# Patient Record
(demographics unavailable — no encounter records)

---

## 2024-12-18 NOTE — REVIEW OF SYSTEMS
[Negative] : Heme/Lymph [FreeTextEntry5] : hypertension, hyperlipidemia, mitral valve repair [FreeTextEntry8] : BPH [de-identified] : wound left lateral lower leg

## 2024-12-18 NOTE — HISTORY OF PRESENT ILLNESS
[FreeTextEntry1] : 12/18/24  CHECO SUMMERS is being seen for a initial assessment visit. Patient presents with left lower leg wound, trauma wound from a car door. The wound occurred 5-6 weeks ago. There was a scab, and patient removed the scab recently and went to dermatologist for wound care. The dermatologist biopsied the wound and referred to the [facility]. Biopsy was negative for malignancy. Patient accompanied by family member.

## 2024-12-18 NOTE — VITALS
[Pain related to present condition?] : The patient's  pain is related to present condition. [Sharp] : sharp [Occasional] : occasional [] : Yes [de-identified] : 10/10 [FreeTextEntry3] : Left lower leg [FreeTextEntry1] : dangling legs [FreeTextEntry2] : elevation [FreeTextEntry4] : dangle legs [FreeTextEntry5] : Medication reconciliation, initial visit

## 2024-12-18 NOTE — ASSESSMENT
[Verbal] : Verbal [Demo] : Demo [Patient] : Patient [Significant other] : Significant other [Good - alert, interested, motivated] : Good - alert, interested, motivated [Verbalizes knowledge/Understanding] : Verbalizes knowledge/understanding [Dressing changes] : dressing changes [Foot Care] : foot care [Skin Care] : skin care [Signs and symptoms of infection] : sign and symptoms of infection [Venous Disease] : venous disease [Nutrition] : nutrition [Arterial Disease] : arterial disease [How and When to Call] : how and when to call [Labs and Tests] : labs and tests [Pain Management] : pain management [Patient responsibility to plan of care] : patient responsibility to plan of care [] : Yes [Stable] : stable [Home] : Home [Ambulatory] : Ambulatory [Not Applicable - Long Term Care/Home Health Agency] : Long Term Care/Home Health Agency: Not Applicable [FreeTextEntry2] : Infection prevention Wound care (dressing changes) Maintain optimal skin integrity to high pressure areas Nutrition and wound healing Elevation and low sodium compliance. Offloading the stress on skin structures and decreasing potential pathologic biomechanical influences. Venous stasis skin changes of lower legs, hemosiderin staining Vascular studies Autolytic debridement [FreeTextEntry3] : Initial Visit [FreeTextEntry4] : F/U 2 weeks Referred to podiatry clinic for nail/foot care. Start use of medihoney, pharmacy verified. Patient will be able to perform dressing changes at home. Supply order requested, small amount of supplies provided to prevent delay in care. Vascular consult submitted, vascular studies submitted for authorization.

## 2024-12-18 NOTE — PLAN
[FreeTextEntry1] : laceration left lateral lower leg Vivian Hatch,DD, QOD venous and arterial vascular studies Vascular consult return 2 weeks 30 minutes spent in review,evaluation and treatment planning

## 2024-12-18 NOTE — PHYSICAL EXAM
[4 x 4] : 4 x 4  [0] : left 0 [Ankle Swelling (On Exam)] : present [Ankle Swelling On The Right] : mild [Ankle Swelling On The Left] : of the left ankle [Alert] : alert [Oriented to Person] : oriented to person [Oriented to Place] : oriented to place [Oriented to Time] : oriented to time [Anxious] : anxious [de-identified] : WD WN 73 Y/O white male in NAD [de-identified] : wound left lateral lower leg. No SOI some slough noted [FreeTextEntry1] : lateral lower leg [FreeTextEntry2] : 2.0 [FreeTextEntry3] : 1.2 [FreeTextEntry4] : 0.2 [de-identified] : serosanguinous drainage noted [de-identified] : Medihoney,  Calcium Alginate [de-identified] :  Mechanically cleansed with sterile gauze and normal saline 0.9% Paper tape [de-identified] :  Dorsalis Pedis: +1 Right/Left Posterior Tibialis: +1 Right/Left Doppler pulses: positive (Right DP/PT monophasic, Left DP monophasic, Left PT biphasic) Extremity color: normal Extremity temperature: Warm Capillary refill: < 3 sec Thickened yellow toenails +3 edema of lower legs [TWNoteComboBox1] : Left [TWNoteComboBox4] : Moderate [TWNoteComboBox5] : No [TWNoteComboBox6] : Traumatic [de-identified] : No [de-identified] : Normal [de-identified] : None [de-identified] : None [de-identified] : <20% [de-identified] : Yes [de-identified] : 3x Weekly [de-identified] : Primary Dressing

## 2024-12-30 NOTE — REVIEW OF SYSTEMS
[Hand Pain] : hand pain [Rash] : no rash [Depression] : no depression [Anxiety] : no anxiety [Easy Bleeding] : no tendency for easy bleeding [Easy Bruising] : no tendency for easy bruising [Negative] : Heme/Lymph [FreeTextEntry5] : See HPI  [de-identified] : Vertigo

## 2024-12-30 NOTE — HISTORY OF PRESENT ILLNESS
[FreeTextEntry1] : I saw Cirilo Swann in the office today for cardiac follow up.      He is a 72-year-old white male who is status post mitral valve repair 2002. At that time he had normal coronary arteries. He is also being treated for hypertension. He denies chest pain, SOB, MAIER.  He has intermittent ankle swelling.   He is physically active and has no chest pain. Occasionally he has mild shortness of breath. He does not do exercise but keep himself physically active.  The patient had a carotid Doppler performed yearly since valve repair which demonstrates mild plaque. He continues to take his ASA 3 x a week despite education on importance of taking daily.  He is also not taking his Crestor daily as he does not like how he feels.    Most recent echocardiography done in the hospital showed normal LV function with no significant valvular disease.  I increased nebivolol to 10 QD.   His BP is better controlled.  He at times gets spikes in his BP, mostly related to timing of his medications.  His BP is controlled in the office today.    He is averse to adding more medications to his regimen. He is dealing with a RLE wound that he got from leg trauma when getting out of his car.

## 2024-12-30 NOTE — REASON FOR VISIT
[Follow-Up - Clinic] : a clinic follow-up of [Hypertension] : hypertension [FreeTextEntry1] : Mitral valve repair, VPCs

## 2024-12-30 NOTE — DISCUSSION/SUMMARY
[FreeTextEntry1] : 73 y/o male with PMhx as noted who presents to the office for a follow up evaluation.   His EKG unchanged. He should continue a daily baby aspirin.  He should be better about taking it daily.  He does not want to take Crestor.   His BP is mostly controlled, but he does have spikes related to when he takes his medications.   He will continue nebivolol 10 QD and nifedipine 60 QD.  He is taking low dose Lasix, to be taken as needed for mild ankle edema.  We discussed importance of healthy eating and daily exercise to reduce the risk of future cardiac events.    He will follow in three months.  He knows to call the office with any issues.  I am repeating a full set of blood work.  [EKG obtained to assist in diagnosis and management of assessed problem(s)] : EKG obtained to assist in diagnosis and management of assessed problem(s)

## 2024-12-30 NOTE — PHYSICAL EXAM
[General Appearance - Well Developed] : well developed [Normal Appearance] : normal appearance [Well Groomed] : well groomed [General Appearance - Well Nourished] : well nourished [No Deformities] : no deformities [General Appearance - In No Acute Distress] : no acute distress [Normal Conjunctiva] : the conjunctiva exhibited no abnormalities [Normal Oral Mucosa] : normal oral mucosa [Normal Jugular Venous A Waves Present] : normal jugular venous A waves present [Normal Jugular Venous V Waves Present] : normal jugular venous V waves present [No Jugular Venous Ortiz A Waves] : no jugular venous ortiz A waves [Respiration, Rhythm And Depth] : normal respiratory rhythm and effort [Exaggerated Use Of Accessory Muscles For Inspiration] : no accessory muscle use [Auscultation Breath Sounds / Voice Sounds] : lungs were clear to auscultation bilaterally [Bowel Sounds] : normal bowel sounds [Abdomen Soft] : soft [Abdomen Tenderness] : non-tender [Abnormal Walk] : normal gait [Gait - Sufficient For Exercise Testing] : the gait was sufficient for exercise testing [Nail Clubbing] : no clubbing of the fingernails [Cyanosis, Localized] : no localized cyanosis [Skin Color & Pigmentation] : normal skin color and pigmentation [Skin Turgor] : normal skin turgor [] : no rash [Oriented To Time, Place, And Person] : oriented to person, place, and time [Impaired Insight] : insight and judgment were intact [No Anxiety] : not feeling anxious [Normal Rate] : normal [Normal S1] : normal S1 [Normal S2] : normal S2 [S3] : no S3 [S4] : no S4 [No Murmur] : no murmurs heard [Right Carotid Bruit] : no bruit heard over the right carotid [Left Carotid Bruit] : no bruit heard over the left carotid [Right Femoral Bruit] : no bruit heard over the right femoral artery [Left Femoral Bruit] : no bruit heard over the left femoral artery [2+] : left 2+ [No Abnormalities] : the abdominal aorta was not enlarged and no bruit was heard [___ +] : bilateral [unfilled]U+ pitting edema to the ankles

## 2024-12-30 NOTE — CARDIOLOGY SUMMARY
[de-identified] : Sinus bradycardia.  [de-identified] : 5/2022 - Mitral Valve annuplasty ring seen. minimal mitral regurgitation. EF 55 - 60% [de-identified] : 8/2019 Carotid Doppler - mild plaque\par  5/2022 - Carotid Doppler - mild plaque to Left and right carotid [Normal] : normal [___] : [unfilled] [LVEF ___%] : LVEF [unfilled]%

## 2025-01-03 NOTE — PHYSICAL EXAM
[4 x 4] : 4 x 4  [0] : left 0 [Ankle Swelling (On Exam)] : present [Ankle Swelling On The Left] : of the left ankle [Ankle Swelling On The Right] : mild [Alert] : alert [Oriented to Person] : oriented to person [Oriented to Place] : oriented to place [Oriented to Time] : oriented to time [Anxious] : anxious [de-identified] : <20% [de-identified] : WD WN 73 Y/O white male in NAD [de-identified] : wound left lateral lower leg. No SOI some slough noted [FreeTextEntry1] : lateral lower leg [FreeTextEntry2] : 2.9 [FreeTextEntry3] : 1.3 [FreeTextEntry4] : 0.2-0.3 [de-identified] : serosanguinous drainage noted [de-identified] : hyperpigmentation/crusting [de-identified] : 10% black dry necrotic  [de-identified] : 70%moist red granulation [de-identified] : 20% firmly adherent slough [de-identified] : Medihoney,  Calcium Alginate [de-identified] :  Mechanically cleansed with sterile gauze and normal saline 0.9% Paper tape [de-identified] :  Dorsalis Pedis: +1 Right/Left Posterior Tibialis: +1 Right/Left Doppler pulses: positive (Right DP/PT monophasic, Left DP monophasic, Left PT biphasic) Extremity color: normal Extremity temperature: Warm Capillary refill: < 3 sec Thickened yellow toenails +3 edema of lower legs [TWNoteComboBox1] : Left [TWNoteComboBox5] : No [TWNoteComboBox4] : Small [TWNoteComboBox6] : Traumatic [de-identified] : No [de-identified] : other [de-identified] : None [de-identified] : <20% [de-identified] : Yes [de-identified] : 3x Weekly [de-identified] : Primary Dressing

## 2025-01-03 NOTE — PLAN
[FreeTextEntry1] : laceration left lateral lower leg Holden,,DD, QOD venous and arterial vascular studies pending Vascular consult return 2 weeks 20 minutes spent in review,evaluation and treatment planning

## 2025-01-03 NOTE — REVIEW OF SYSTEMS
[Negative] : Heme/Lymph [FreeTextEntry5] : hypertension, hyperlipidemia, mitral valve repair [FreeTextEntry8] : BPH [de-identified] : wound left lateral lower leg

## 2025-01-03 NOTE — PHYSICAL EXAM
[4 x 4] : 4 x 4  [0] : left 0 [Ankle Swelling (On Exam)] : present [Ankle Swelling On The Left] : of the left ankle [Ankle Swelling On The Right] : mild [Alert] : alert [Oriented to Person] : oriented to person [Oriented to Place] : oriented to place [Oriented to Time] : oriented to time [Anxious] : anxious [de-identified] : <20% [de-identified] : WD WN 73 Y/O white male in NAD [de-identified] : wound left lateral lower leg. No SOI some slough noted [FreeTextEntry1] : lateral lower leg [FreeTextEntry2] : 2.9 [FreeTextEntry3] : 1.3 [FreeTextEntry4] : 0.2-0.3 [de-identified] : serosanguinous drainage noted [de-identified] : hyperpigmentation/crusting [de-identified] : 10% black dry necrotic  [de-identified] : 70%moist red granulation [de-identified] : 20% firmly adherent slough [de-identified] : Medihoney,  Calcium Alginate [de-identified] :  Mechanically cleansed with sterile gauze and normal saline 0.9% Paper tape [de-identified] :  Dorsalis Pedis: +1 Right/Left Posterior Tibialis: +1 Right/Left Doppler pulses: positive (Right DP/PT monophasic, Left DP monophasic, Left PT biphasic) Extremity color: normal Extremity temperature: Warm Capillary refill: < 3 sec Thickened yellow toenails +3 edema of lower legs [TWNoteComboBox1] : Left [TWNoteComboBox4] : Small [TWNoteComboBox5] : No [TWNoteComboBox6] : Traumatic [de-identified] : No [de-identified] : other [de-identified] : None [de-identified] : <20% [de-identified] : Yes [de-identified] : 3x Weekly [de-identified] : Primary Dressing

## 2025-01-03 NOTE — ASSESSMENT
[Verbal] : Verbal [Demo] : Demo [Patient] : Patient [Significant other] : Significant other [Good - alert, interested, motivated] : Good - alert, interested, motivated [Verbalizes knowledge/Understanding] : Verbalizes knowledge/understanding [Dressing changes] : dressing changes [Foot Care] : foot care [Skin Care] : skin care [Signs and symptoms of infection] : sign and symptoms of infection [Venous Disease] : venous disease [Nutrition] : nutrition [Arterial Disease] : arterial disease [How and When to Call] : how and when to call [Labs and Tests] : labs and tests [Pain Management] : pain management [Patient responsibility to plan of care] : patient responsibility to plan of care [Stable] : stable [Home] : Home [Ambulatory] : Ambulatory [Not Applicable - Long Term Care/Home Health Agency] : Long Term Care/Home Health Agency: Not Applicable [] : No [FreeTextEntry2] : Infection prevention Wound care (dressing changes) Maintain optimal skin integrity to high pressure areas Nutrition and wound healing Elevation and low sodium compliance. Offloading the stress on skin structures and decreasing potential pathologic biomechanical influences. Venous stasis skin changes of lower legs, hemosiderin staining Vascular studies Autolytic debridement [FreeTextEntry4] : F/U 2 weeks pt is independent with dressing changes, pt has supplies  vascular studies approved, pt provided with vascular lab contact number

## 2025-01-03 NOTE — REVIEW OF SYSTEMS
[Negative] : Heme/Lymph [FreeTextEntry5] : hypertension, hyperlipidemia, mitral valve repair [FreeTextEntry8] : BPH [de-identified] : wound left lateral lower leg

## 2025-01-03 NOTE — HISTORY OF PRESENT ILLNESS
[FreeTextEntry1] : 12/18/24  CHECO SUMMERS is being seen for a initial assessment visit. Patient presents with left lower leg wound, trauma wound from a car door. The wound occurred 5-6 weeks ago. There was a scab, and patient removed the scab recently and went to dermatologist for wound care. The dermatologist biopsied the wound and referred to the [facility]. Biopsy was negative for malignancy. Patient accompanied by family member. 1/3/25 left shin wound smaller and . No SOI

## 2025-01-03 NOTE — VITALS
[Pain related to present condition?] : The patient's  pain is related to present condition. [Burning] : burning [] : No [de-identified] : 8/10 [FreeTextEntry3] : DEMOND  [FreeTextEntry1] : LE dependent  [FreeTextEntry2] : elevation/worse at night  [FreeTextEntry4] : legs Dependent during visit

## 2025-01-03 NOTE — VITALS
[Pain related to present condition?] : The patient's  pain is related to present condition. [Burning] : burning [] : No [de-identified] : 8/10 [FreeTextEntry3] : DEMOND  [FreeTextEntry1] : LE dependent  [FreeTextEntry2] : elevation/worse at night  [FreeTextEntry4] : legs Dependent during visit

## 2025-01-03 NOTE — HISTORY OF PRESENT ILLNESS
[FreeTextEntry1] : 12/18/24  CHECO SUMMERS is being seen for a initial assessment visit. Patient presents with left lower leg wound, trauma wound from a car door. The wound occurred 5-6 weeks ago. There was a scab, and patient removed the scab recently and went to dermatologist for wound care. The dermatologist biopsied the wound and referred to the [facility]. Biopsy was negative for malignancy. Patient accompanied by family member. 1/3/25 left shin wound smaller and . No SOI

## 2025-01-16 NOTE — HISTORY OF PRESENT ILLNESS
[FreeTextEntry1] : 12/18/24  CHECO SUMMERS is being seen for a initial assessment visit. Patient presents with left lower leg wound, trauma wound from a car door. The wound occurred 5-6 weeks ago. There was a scab, and patient removed the scab recently and went to dermatologist for wound care. The dermatologist biopsied the wound and referred to the [facility]. Biopsy was negative for malignancy. Patient accompanied by family member. 1/3/25 left shin wound smaller and . No SOI 1/16/25 left lower leg wound laterally  and slightly longer. Patient admits to working recently but has now stopped. New small scabbed wound anteriorly from tape. No SOI Patient relates that he has left lower leg wound pain when elevated with improvement when dependent

## 2025-01-16 NOTE — REVIEW OF SYSTEMS
[Negative] : Heme/Lymph [FreeTextEntry5] : hypertension, hyperlipidemia, mitral valve repair [FreeTextEntry8] : BPH [de-identified] : wound left lateral lower leg

## 2025-01-16 NOTE — ASSESSMENT
[Verbal] : Verbal [Demo] : Demo [Patient] : Patient [Significant other] : Significant other [Good - alert, interested, motivated] : Good - alert, interested, motivated [Verbalizes knowledge/Understanding] : Verbalizes knowledge/understanding [Dressing changes] : dressing changes [Foot Care] : foot care [Skin Care] : skin care [Signs and symptoms of infection] : sign and symptoms of infection [Venous Disease] : venous disease [Nutrition] : nutrition [Arterial Disease] : arterial disease [How and When to Call] : how and when to call [Labs and Tests] : labs and tests [Pain Management] : pain management [Patient responsibility to plan of care] : patient responsibility to plan of care [Stable] : stable [Home] : Home [Ambulatory] : Ambulatory [Not Applicable - Long Term Care/Home Health Agency] : Long Term Care/Home Health Agency: Not Applicable [] : Yes [FreeTextEntry2] : Infection prevention Wound care (dressing changes) Maintain optimal skin integrity to high pressure areas Nutrition and wound healing Elevation and low sodium compliance. Offloading the stress on skin structures and decreasing potential pathologic biomechanical influences. Venous stasis skin changes of lower legs, hemosiderin staining Vascular studies Autolytic debridement [FreeTextEntry4] : F/U 1 week Vascular appointment on 1/17/25. consult date pending.  pt is independent with dressing changes, pt has supplies

## 2025-01-16 NOTE — PHYSICAL EXAM
[0] : left 0 [Ankle Swelling (On Exam)] : present [Ankle Swelling On The Left] : of the left ankle [Ankle Swelling On The Right] : mild [Alert] : alert [Oriented to Person] : oriented to person [Oriented to Place] : oriented to place [Oriented to Time] : oriented to time [Anxious] : anxious [de-identified] : WD WN 73 Y/O white male in NAD [de-identified] : wound left lateral lower leg. No SOI some slough noted [2 x 2] : 2 x 2  [FreeTextEntry1] : lateral lower leg [FreeTextEntry2] : 3.5 [FreeTextEntry3] : 1.4 [FreeTextEntry4] : 0.2 [de-identified] : serosanguinous drainage noted [de-identified] : hyperpigmentation/crusting [de-identified] : 5% black dry necrotic  [de-identified] : 80%moist red granulation [de-identified] : 15% firmly adherent slough [de-identified] : Santyl  [de-identified] :  Mechanically cleansed with sterile gauze and normal saline 0.9% Cloth tape [FreeTextEntry7] : Anterior lower leg [FreeTextEntry8] : 0.4 [FreeTextEntry9] : 1.2 [de-identified] : 0.1 [de-identified] : Due to harsh tape removal  [de-identified] : DSNARESH [de-identified] :  Mechanically cleansed with sterile gauze and normal saline 0.9% Cloth tape [de-identified] :  Dorsalis Pedis: +1 Right/Left Posterior Tibialis: +1 Right/Left Doppler pulses: positive (Right DP/PT monophasic, Left DP monophasic, Left PT biphasic) Extremity color: normal Extremity temperature: Warm Capillary refill: < 3 sec Thickened yellow toenails +3 edema of lower legs [TWNoteComboBox1] : Left [TWNoteComboBox4] : Small [TWNoteComboBox5] : No [TWNoteComboBox6] : Traumatic [de-identified] : No [de-identified] : other [de-identified] : None [de-identified] : False [de-identified] : Yes [de-identified] : 3x Weekly [de-identified] : Primary Dressing [TWNoteComboBox9] : Left [de-identified] : None [de-identified] : No [de-identified] : Traumatic [de-identified] : No [de-identified] : Normal [de-identified] : None [de-identified] : None [de-identified] : 100% [de-identified] : No [de-identified] : 3x Weekly [de-identified] : Primary Dressing

## 2025-01-16 NOTE — PLAN
[FreeTextEntry1] : laceration left lateral lower leg collagenase,DD, QD venous and arterial vascular studies pending Vascular consult return 1 week 20 minutes spent in review,evaluation and treatment planning

## 2025-01-16 NOTE — VITALS
[Pain related to present condition?] : The patient's  pain is related to present condition. [Burning] : burning [] : No [de-identified] : 4/10 [FreeTextEntry3] : DEMOND  [FreeTextEntry1] : LE dependent  [FreeTextEntry2] : elevation/worse at night  [FreeTextEntry4] : legs Dependent during visit

## 2025-01-22 NOTE — PHYSICAL EXAM
[Normal Breath Sounds] : Normal breath sounds [2+] : left 2+ [Ankle Swelling (On Exam)] : present [Ankle Swelling Bilaterally] : bilaterally  [Ankle Swelling On The Left] : moderate [Varicose Veins Of Lower Extremities] : present [Varicose Veins Of The Right Leg] : of the right leg [] : of the left leg [Ankle Swelling On The Right] : mild [Skin Ulcer] : ulcer [Alert] : alert [Oriented to Person] : oriented to person [Oriented to Place] : oriented to place [Oriented to Time] : oriented to time [Calm] : calm [de-identified] : Appears well [de-identified] : +BLLE swelling, L >R with evidence of pitting edema.  +Lipodermatosclerosis bilateral calves.  LLE lateral wound clean, superficial, healthy pink granulation.

## 2025-01-22 NOTE — HISTORY OF PRESENT ILLNESS
[FreeTextEntry1] : 71 yo male accompanied by his wife presents as a new patient for a LLE lateral calf wound assessment.  Pt sustained traumatic wound in Dec 2024.  Pt is currently taking a diuretic prescribed by his cardiologist for BLLE chronic swelling.  Pt's swelling on his LLE has been worse since he sustained the injury and wound in Dec.  He is currently being treated at the [facility].  Pmx HLD, chronic lower extremity swelling, s/p MVR, BPH.

## 2025-01-22 NOTE — ASSESSMENT
[FreeTextEntry1] : 73yo male with evidence of chronic swelling and venous skin changes presents with an open clean left lateral wound. Bilateral lower extremities edema, worse on the Left from dorsum of the foot ankle and calf. Positive chronic skin changes/lipodermatosclerosis. Pt has elements of Lymphedema secondary to years of chronic swelling and likely exasperated recently by his traumatic injury.    Recent vascular testing shows he has adequate arterial perfusion and no evidence of any significant venous reflux. At this time, no further vascular intervention indicated.  Pt explained to importance of controlling swelling and being compliant with his diuretic medication to help in wound healing.    Recommend compression stockings 20-30 mmHg daily from awakening until bedtime, leg elevation above the level of the heart at rest, frequent ambulation and walk daily for exercise. Advised patient to follow up if he develops worsening symptoms including LE pain, swelling or worsening varicosities.    Prior to appointment and during encounter with patient extensive medical records were reviewed including but not limited to, Hospital records, out patient records, laboratory data and microbiology data In addition extensive time was also spent in reviewing diagnostic studies.  Total encounter total time 45 mins >50% of time spent counseling/coordinating care

## 2025-01-23 NOTE — PHYSICAL EXAM
[4 x 4] : 4 x 4  [JVD] : no jugular venous distention  [Normal Thyroid] : the thyroid was normal [Normal Breath Sounds] : Normal breath sounds [Normal Heart Sounds] : normal heart sounds [Normal Rate and Rhythm] : normal rate and rhythm [Ankle Swelling (On Exam)] : present [Ankle Swelling On The Left] : moderate [Abdomen Masses] : No abdominal massess [Abdomen Tenderness] : ~T ~M No abdominal tenderness [Tender] : nontender [Enlarged] : not enlarged [Alert] : alert [Oriented to Person] : oriented to person [Oriented to Place] : oriented to place [Oriented to Time] : oriented to time [Calm] : calm [de-identified] : elderly WM, NAD, alert, Ox3. [FreeTextEntry1] : Left lateral lower leg  [FreeTextEntry2] : 4.4 [FreeTextEntry3] : 1.6 [FreeTextEntry4] : 0.2 [de-identified] : Serous/sanguinous [de-identified] : 15% [de-identified] : Cynthia  [de-identified] : Mechanically cleansed with sterile gauze and normal saline. Kerlix  [FreeTextEntry7] : Left anterior lower leg - Dry scab - No oepn wounds  [de-identified] : Dry dressing  [TWNoteComboBox4] : Small [de-identified] : Mechanically cleansed with sterile gauze and normal saline. Kerlix  [TWNoteComboBox6] : Traumatic [de-identified] : Normal [de-identified] : None [de-identified] : None [de-identified] : >75% [de-identified] : Yes [de-identified] : Every other day [de-identified] : Primary Dressing [de-identified] : None [de-identified] : Traumatic [de-identified] : Normal [de-identified] : None [de-identified] : None [de-identified] : No [de-identified] : Every other day [de-identified] : Secondary Dressing

## 2025-01-23 NOTE — VITALS
[Pain related to present condition?] : The patient's  pain is related to present condition. [Tender] : tender [Occasional] : occasional [] : No [de-identified] : 4/10  [FreeTextEntry1] : Protective dressing.  [FreeTextEntry3] : Left lateral leg  [FreeTextEntry2] : Direct contact  [FreeTextEntry4] : Protective dressing

## 2025-01-23 NOTE — HISTORY OF PRESENT ILLNESS
[FreeTextEntry1] : 71 yo WM, here for f/u of chronic LLE traumatic wound. Has been using collagenase and granulation tissue now seen. No SOI. Recent bx was neg by dermatologist as per pt. Pt met with vasccular surg. Dr. Kelley who recommended leg elevation/compression.

## 2025-01-23 NOTE — REVIEW OF SYSTEMS
[Negative] : Heme/Lymph [FreeTextEntry5] : hypertension, hyperlipidemia, mitral valve repair [FreeTextEntry8] : BPH [de-identified] : wound left lateral lower leg

## 2025-01-23 NOTE — ASSESSMENT
[Verbal] : Verbal [Written] : Written [Demo] : Demo [Patient] : Patient [Good - alert, interested, motivated] : Good - alert, interested, motivated [Verbalizes knowledge/Understanding] : Verbalizes knowledge/understanding [Dressing changes] : dressing changes [Skin Care] : skin care [Signs and symptoms of infection] : sign and symptoms of infection [Venous Disease] : venous disease [Nutrition] : nutrition [How and When to Call] : how and when to call [Pain Management] : pain management [Patient responsibility to plan of care] : patient responsibility to plan of care [] : Yes [Stable] : stable [Home] : Home [Ambulatory] : Ambulatory [Not Applicable - Long Term Care/Home Health Agency] : Long Term Care/Home Health Agency: Not Applicable [FreeTextEntry2] : Infection prevention Localized wound care  Goal remaining pain free regarding wounds collagen matrix therapy  [FreeTextEntry4] : Patient has supplies at home and preforms his own dressing changes. Follow up in 1 week

## 2025-01-30 NOTE — HISTORY OF PRESENT ILLNESS
[FreeTextEntry1] : 73 yo WM, here for f/u of chronic LLE traumatic wound. Has been using collagenase and granulation tissue now seen. No SOI. Recent bx was neg by dermatologist as per pt. Pt met with vasccular surg. Dr. Kelley who recommended leg elevation/compression. 1/30/25 left lateral lower leg wound larger with slough. No SOI

## 2025-01-30 NOTE — REVIEW OF SYSTEMS
[Negative] : Heme/Lymph [FreeTextEntry5] : hypertension, hyperlipidemia, mitral valve repair [FreeTextEntry8] : BPH [de-identified] : wound left lateral lower leg

## 2025-01-30 NOTE — PHYSICAL EXAM
[4 x 4] : 4 x 4  [JVD] : no jugular venous distention  [Normal Thyroid] : the thyroid was normal [Normal Breath Sounds] : Normal breath sounds [Normal Heart Sounds] : normal heart sounds [Normal Rate and Rhythm] : normal rate and rhythm [Ankle Swelling (On Exam)] : present [Ankle Swelling On The Left] : moderate [Abdomen Masses] : No abdominal massess [Abdomen Tenderness] : ~T ~M No abdominal tenderness [Tender] : nontender [Enlarged] : not enlarged [Alert] : alert [Oriented to Person] : oriented to person [Oriented to Place] : oriented to place [Oriented to Time] : oriented to time [Calm] : calm [de-identified] : elderly WM, NAD, alert, Ox3. [FreeTextEntry1] : Left lateral lower leg  [FreeTextEntry2] : 6.0 [FreeTextEntry3] : 2.0 [FreeTextEntry4] : 0.2 [de-identified] : Serosanguinous [de-identified] : 40% [de-identified] : 40% [de-identified] : Personal Compression Stockings  - Circulation and Neuromuscular assessment done post application. [de-identified] : Collagenase  [de-identified] : Mechanically cleansed with 0.9% Normal Saline. Kerlix  [FreeTextEntry7] : Left anterior lower leg - Dry scab - No oepn wounds  [de-identified] : Dry dressing  [de-identified] : Mechanically cleansed with 0.9% Normal Saline. Kerlix  [TWNoteComboBox4] : Moderate [TWNoteComboBox5] : No [TWNoteComboBox6] : Traumatic [de-identified] : No [de-identified] : Normal [de-identified] : None [de-identified] : <20% [de-identified] : False [de-identified] : Yes [de-identified] : Daily [de-identified] : Primary Dressing [de-identified] : None [de-identified] : Traumatic [de-identified] : Normal [de-identified] : None [de-identified] : None [de-identified] : No [de-identified] : Every other day [de-identified] : Secondary Dressing

## 2025-01-30 NOTE — PLAN
[FreeTextEntry1] : leg elevation stressed collagenaseDD QD, own compression f/u 1 wk  time spent 25 mins.

## 2025-01-30 NOTE — ASSESSMENT
[Verbal] : Verbal [Written] : Written [Demo] : Demo [Patient] : Patient [Good - alert, interested, motivated] : Good - alert, interested, motivated [Verbalizes knowledge/Understanding] : Verbalizes knowledge/understanding [Dressing changes] : dressing changes [Skin Care] : skin care [Signs and symptoms of infection] : sign and symptoms of infection [Venous Disease] : venous disease [Nutrition] : nutrition [How and When to Call] : how and when to call [Pain Management] : pain management [Patient responsibility to plan of care] : patient responsibility to plan of care [Stable] : stable [Home] : Home [Ambulatory] : Ambulatory [Not Applicable - Long Term Care/Home Health Agency] : Long Term Care/Home Health Agency: Not Applicable [Compression Therapy] : compression therapy [] : No [FreeTextEntry2] : Infection Prevention Wound care and Dressing changes Promote and Restore optimal skin integrity Nutrition and Wound Healing  Offloading and Pressure relief Protect and Guard wound site  Maintain acceptable levels of Pain Compliance R/T Compression therapy Compliance R/T Elevation of Lower Extremities [FreeTextEntry3] : Wound Larger, Increase in Fibrinous tissue.  [FreeTextEntry4] : MD assessed wound site Changed tx to Collagenase QD Advised pt. wear Personal compression stockings daily, not just "occassionally" -  Offered ACE Wraps this visit - Pt. declined.  Patient verbalized understanding of all discussed. Patient to return to [facility] in One week Pt. has gauze, personal compression stockings.  Given small amount of Collagenase for continuity of care.

## 2025-02-06 NOTE — PLAN
[FreeTextEntry1] : leg elevation stressed collagenaseDD QD, own compression doxycycline 100mg BID x 1 week f/u 1 wk  time spent 25 mins.

## 2025-02-06 NOTE — HISTORY OF PRESENT ILLNESS
[FreeTextEntry1] : 71 yo WM, here for f/u of chronic LLE traumatic wound. Has been using collagenase and granulation tissue now seen. No SOI. Recent bx was neg by dermatologist as per pt. Pt met with vasccular surg. Dr. Kelley who recommended leg elevation/compression. 1/30/25 left lateral lower leg wound larger with slough. No SOI 2/6/25 mild periwound erythema and increased discomfort. Slight increased warmth. Wound slightly larger. Some serous drainage

## 2025-02-06 NOTE — PHYSICAL EXAM
[4 x 4] : 4 x 4  [Normal Thyroid] : the thyroid was normal [Normal Breath Sounds] : Normal breath sounds [Normal Heart Sounds] : normal heart sounds [Normal Rate and Rhythm] : normal rate and rhythm [Ankle Swelling (On Exam)] : present [Ankle Swelling On The Left] : moderate [Alert] : alert [Oriented to Person] : oriented to person [Oriented to Place] : oriented to place [Oriented to Time] : oriented to time [Calm] : calm [JVD] : no jugular venous distention  [Abdomen Masses] : No abdominal massess [Abdomen Tenderness] : ~T ~M No abdominal tenderness [Tender] : nontender [Enlarged] : not enlarged [de-identified] : elderly WM, NAD, alert, Ox3. [FreeTextEntry1] : Left lateral lower leg  [FreeTextEntry2] : 6.0 [FreeTextEntry3] : 3.0 [FreeTextEntry4] : 0.2 [de-identified] : Serosanguinous [de-identified] : 40% [de-identified] : 40% [de-identified] : @ 10:33 [de-identified] : Personal Compression Stockings  - Circulation and Neuromuscular assessment done post application. [de-identified] : Collagenase,  Calcium Alginate  [de-identified] : Mechanically cleansed with 0.9% Normal Saline. Kerlix  [FreeTextEntry7] : Left anterior lower leg - healed [TWNoteComboBox4] : Moderate [TWNoteComboBox5] : No [TWNoteComboBox6] : Traumatic [de-identified] : No [de-identified] : Normal [de-identified] : None [de-identified] : <20% [de-identified] : Yes [de-identified] : Cultures obtained [de-identified] : Daily [de-identified] : Primary Dressing [de-identified] : None [de-identified] : Traumatic [de-identified] : Normal [de-identified] : None [de-identified] : None [de-identified] : No

## 2025-02-06 NOTE — REVIEW OF SYSTEMS
[Negative] : Heme/Lymph [FreeTextEntry5] : hypertension, hyperlipidemia, mitral valve repair [FreeTextEntry8] : BPH [de-identified] : wound left lateral lower leg

## 2025-02-06 NOTE — ASSESSMENT
[Verbal] : Verbal [Written] : Written [Demo] : Demo [Patient] : Patient [Good - alert, interested, motivated] : Good - alert, interested, motivated [Verbalizes knowledge/Understanding] : Verbalizes knowledge/understanding [Dressing changes] : dressing changes [Skin Care] : skin care [Signs and symptoms of infection] : sign and symptoms of infection [Venous Disease] : venous disease [Nutrition] : nutrition [How and When to Call] : how and when to call [Pain Management] : pain management [Compression Therapy] : compression therapy [Patient responsibility to plan of care] : patient responsibility to plan of care [Stable] : stable [Home] : Home [Ambulatory] : Ambulatory [Not Applicable - Long Term Care/Home Health Agency] : Long Term Care/Home Health Agency: Not Applicable [Labs and Tests] : labs and tests [] : No [FreeTextEntry2] : Infection Prevention Wound care and Dressing changes Promote and Restore optimal skin integrity Nutrition and Wound Healing  Offloading and Pressure relief Protect and Guard wound site  Maintain acceptable levels of Pain Compliance R/T Compression therapy Compliance R/T Elevation of Lower Extremities [FreeTextEntry3] : Increase in pain, drainage, measurement [FreeTextEntry4] : -Advised pt. wear Personal compression stockings daily, not just "occasionally" -  Offered ACE Wraps this visit - Pt. declined.  -Wound culture obtained from left lower leg wound @ 10:33, sent to lab (no ID consult at this time, will wait for results and see how wound reacts to PO Abt) -PO Abt prescribed, periwound skin is slightly warmer, increased pain, more edema of lower leg. -Friend usually performs dressing changes, she will be away patient will come to [facility] for dressing changes, daily with Yoel. The wound is in an area that is difficult for patient to visualize. -Patient verbalized understanding of all discussed. -F/U 1 week on Thursday for assessment, and daily for dressing changes.

## 2025-02-06 NOTE — VITALS
[Pain related to present condition?] : The patient's  pain is related to present condition. [Sharp] : sharp [Tender] : tender [Occasional] : occasional [] : No [de-identified] : 8/10 [FreeTextEntry3] : Left lower leg wound site [FreeTextEntry1] : dangling leg, [FreeTextEntry2] : pressure/elevation of leg in bed/wound care [FreeTextEntry4] : dangling leg, patient reports relief after wound care is completed

## 2025-02-12 NOTE — VITALS
[] : No [de-identified] : denies pain currently [FreeTextEntry3] : left lateral leg [FreeTextEntry1] : walking [FreeTextEntry2] : laying in bed and pressure [FreeTextEntry4] : moving around

## 2025-02-12 NOTE — ASSESSMENT
[Verbal] : Verbal [Demo] : Demo [Patient] : Patient [Good - alert, interested, motivated] : Good - alert, interested, motivated [Verbalizes knowledge/Understanding] : Verbalizes knowledge/understanding [Dressing changes] : dressing changes [Skin Care] : skin care [Signs and symptoms of infection] : sign and symptoms of infection [Venous Disease] : venous disease [Nutrition] : nutrition [How and When to Call] : how and when to call [Labs and Tests] : labs and tests [Pain Management] : pain management [Compression Therapy] : compression therapy [Patient responsibility to plan of care] : patient responsibility to plan of care [Stable] : stable [Home] : Home [Ambulatory] : Ambulatory [Not Applicable - Long Term Care/Home Health Agency] : Long Term Care/Home Health Agency: Not Applicable [] : No [FreeTextEntry2] : Infection Prevention Wound care and Dressing changes Promote and Restore optimal skin integrity Nutrition and Wound Healing  Offloading and Pressure relief Protect and Guard wound site  Maintain acceptable levels of Pain Compliance R/T Compression therapy Compliance R/T Elevation of Lower Extremities [FreeTextEntry3] : increased granulation [FreeTextEntry4] : -Advised pt. wear Personal compression stockings daily, not just "occasionally" -  Offered ACE Wraps this visit - Pt. declined.  Dr Gleason re enforced need to elevate at night after taking off the compression stockings Put stockings on every morning Return in 1 week

## 2025-02-12 NOTE — HISTORY OF PRESENT ILLNESS
[FreeTextEntry1] : 73 yo WM, here for f/u of chronic LLE traumatic wound. Has been using collagenase and more granulation tissue now seen. No SOI. Recent bx was neg by dermatologist as per pt. Pt met with vascular surg. Dr. Kelley who recommended leg elevation/compression. Placed on po doxycycline for one wk for mild erythema which he is about to finish. Discussed importance of leg elevation/compression. Pt states he is wearing his compr. stockings daily. Pt states he has trouble keeping his leg elevated because of pain in the wound. Presently using a diuretic.      Presently using collagenase and recent culture revealed Serratia m. No susceptibility results on doxycycline. The wound is improving and may be ready soon for connor again and later to consider allografts which I discussed with pt.

## 2025-02-12 NOTE — REVIEW OF SYSTEMS
[Skin Wound] : skin wound [Negative] : Cardiovascular [FreeTextEntry5] : hypertension, hyperlipidemia, mitral valve repair [FreeTextEntry8] : BPH [de-identified] : wound left lateral lower leg

## 2025-02-12 NOTE — PLAN
[FreeTextEntry1] : leg elevation collagenase, DD, compression stockings qd Will consider connor again soon.  f/u 1 wk  time spent 25 mins.

## 2025-02-12 NOTE — PHYSICAL EXAM
[4 x 4] : 4 x 4  [Normal Thyroid] : the thyroid was normal [Normal Breath Sounds] : Normal breath sounds [Normal Heart Sounds] : normal heart sounds [Normal Rate and Rhythm] : normal rate and rhythm [Ankle Swelling (On Exam)] : present [Alert] : alert [Oriented to Person] : oriented to person [Oriented to Place] : oriented to place [Oriented to Time] : oriented to time [Calm] : calm [JVD] : no jugular venous distention  [Ankle Swelling On The Left] : moderate [Abdomen Masses] : No abdominal massess [Abdomen Tenderness] : ~T ~M No abdominal tenderness [Tender] : nontender [Enlarged] : not enlarged [de-identified] : elderly WM, NAD, alert, Ox3. [FreeTextEntry1] : Left lateral lower leg  [FreeTextEntry2] : 6.0 [FreeTextEntry3] : 3.0 [FreeTextEntry4] : 0.2 [de-identified] : Serosanguinous [de-identified] : 10% [de-identified] : 70 % [de-identified] : 20% [de-identified] : Personal Compression Stockings  - Circulation and Neuromuscular assessment done post application. [de-identified] : Collagenase,  Calcium alginate [de-identified] : Mechanically cleansed with 0.9% Normal Saline. Kerlix  [FreeTextEntry7] : Left anterior lower leg - healed [TWNoteComboBox4] : Moderate [TWNoteComboBox5] : No [TWNoteComboBox6] : Traumatic [de-identified] : No [de-identified] : Normal [de-identified] : False [de-identified] : None [de-identified] : Yes [de-identified] : False [de-identified] : Daily [de-identified] : Primary Dressing [de-identified] : None [de-identified] : Normal [de-identified] : Traumatic [de-identified] : None [de-identified] : None [de-identified] : No

## 2025-02-20 NOTE — REVIEW OF SYSTEMS
[Skin Wound] : skin wound [Negative] : Heme/Lymph [FreeTextEntry5] : hypertension, hyperlipidemia, mitral valve repair [FreeTextEntry8] : BPH [de-identified] : wound left lateral lower leg

## 2025-02-20 NOTE — PLAN
[FreeTextEntry1] : leg elevation collagenase, DD, compression stockings qd authorization for debridement complaint of area becoming painful at night.  f/u 1 wk  time spent 25 mins.

## 2025-02-20 NOTE — HISTORY OF PRESENT ILLNESS
[FreeTextEntry1] : 73 yo WM, here for f/u of chronic LLE traumatic wound. Has been using collagenase and more granulation tissue now seen. No SOI. Recent bx was neg by dermatologist as per pt. Pt met with vascular surg. Dr. Kelley who recommended leg elevation/compression. Placed on po doxycycline for one wk for mild erythema which he is about to finish. Discussed importance of leg elevation/compression. Pt states he is wearing his compr. stockings daily. Pt states he has trouble keeping his leg elevated because of pain in the wound. Presently using a diuretic.      Presently using collagenase and recent culture revealed Serratia m. No susceptibility results on doxycycline. The wound is improving and may be ready soon for connor again and later to consider allografts which I discussed with pt. 2/20/25 left lower leg wound stable with less slough and more granulation. No SOI

## 2025-02-20 NOTE — ASSESSMENT
[Verbal] : Verbal [Written] : Written [Demo] : Demo [Patient] : Patient [Good - alert, interested, motivated] : Good - alert, interested, motivated [Verbalizes knowledge/Understanding] : Verbalizes knowledge/understanding [Dressing changes] : dressing changes [Skin Care] : skin care [Signs and symptoms of infection] : sign and symptoms of infection [Venous Disease] : venous disease [Nutrition] : nutrition [How and When to Call] : how and when to call [Pain Management] : pain management [Compression Therapy] : compression therapy [Patient responsibility to plan of care] : patient responsibility to plan of care [Stable] : stable [Home] : Home [Ambulatory] : Ambulatory [Not Applicable - Long Term Care/Home Health Agency] : Long Term Care/Home Health Agency: Not Applicable [] : No [FreeTextEntry2] : Infection prevention Localized wound care  Goal remaining pain free regarding wounds Compression therapy  Enzymatic debridement   [FreeTextEntry4] : Pt performs his own dressing changes and has supplies at home.  Auth submitted for debridement if needed.  Follow up in 1 week

## 2025-02-20 NOTE — PHYSICAL EXAM
[4 x 4] : 4 x 4  [JVD] : no jugular venous distention  [Normal Thyroid] : the thyroid was normal [Normal Breath Sounds] : Normal breath sounds [Normal Heart Sounds] : normal heart sounds [Normal Rate and Rhythm] : normal rate and rhythm [Ankle Swelling (On Exam)] : present [Ankle Swelling On The Left] : moderate [Abdomen Masses] : No abdominal massess [Abdomen Tenderness] : ~T ~M No abdominal tenderness [Tender] : nontender [Enlarged] : not enlarged [Alert] : alert [Oriented to Person] : oriented to person [Oriented to Place] : oriented to place [Oriented to Time] : oriented to time [Calm] : calm [de-identified] : elderly WM, NAD, alert, Ox3. [FreeTextEntry1] : Left lateral lower leg  [FreeTextEntry3] : 3.4 [FreeTextEntry2] : 7.1 [FreeTextEntry4] : 0.2 [de-identified] : Serous/sanguinous [de-identified] : 20%  [de-identified] : Own compression stockings  [de-identified] : collagenase  [de-identified] : Mechanically cleansed with sterile gauze and normal saline. Kerlix  [TWNoteComboBox4] : Moderate [TWNoteComboBox6] : Traumatic [de-identified] : Normal [de-identified] : None [de-identified] : None [de-identified] : >75% [de-identified] : Yes [de-identified] : Other [de-identified] : Daily [de-identified] : Primary Dressing

## 2025-02-20 NOTE — VITALS
[Pain related to present condition?] : The patient's  pain is related to present condition. [Throbbing] : throbbing [Occasional] : occasional [] : No [de-identified] : 6/10  [FreeTextEntry3] : Left leg  [FreeTextEntry1] : Protective dressing  [FreeTextEntry2] : Direct contact  [FreeTextEntry4] : Protective dressing to prevent rubbing.

## 2025-02-26 NOTE — REVIEW OF SYSTEMS
[Skin Wound] : skin wound [Negative] : Heme/Lymph [FreeTextEntry5] : hypertension, hyperlipidemia, mitral valve repair [FreeTextEntry8] : BPH [de-identified] : wound left lateral lower leg

## 2025-02-26 NOTE — HISTORY OF PRESENT ILLNESS
[FreeTextEntry1] : 73 yo WM, here for f/u of chronic LLE traumatic wound. Has been using collagenase and more granulation tissue now seen. No SOI. Recent bx was neg by dermatologist as per pt. Pt met with vascular surg. Dr. Kelley who recommended leg elevation/compression. Placed on po doxycycline for one wk for mild erythema which he is about to finish. Discussed importance of leg elevation/compression. Pt states he is wearing his compr. stockings daily. Pt states he has trouble keeping his leg elevated because of pain in the wound. Presently using a diuretic.      Presently using collagenase and recent culture revealed Serratia m. No susceptibility results on doxycycline. The wound is improving and may be ready soon for connor again and later to consider allografts which I discussed with pt. 2/20/25 left lower leg wound stable with less slough and more granulation. No SOI 2/26/25 left lower leg calf area tender and painful with standing and dorsiflexion. Wound  and No SOI. will send patient for Doppler to R/O DVT

## 2025-02-26 NOTE — REVIEW OF SYSTEMS
[Skin Wound] : skin wound [Negative] : Heme/Lymph [FreeTextEntry5] : hypertension, hyperlipidemia, mitral valve repair [FreeTextEntry8] : BPH [de-identified] : wound left lateral lower leg

## 2025-02-26 NOTE — PLAN
[FreeTextEntry1] : leg elevation collagenase, DD, hold compression stockings qd sent for doppler to R/O DVT complaint of area becoming painful at night.  f/u 1 wk  time spent 25 mins.

## 2025-02-26 NOTE — HISTORY OF PRESENT ILLNESS
[FreeTextEntry1] : 71 yo WM, here for f/u of chronic LLE traumatic wound. Has been using collagenase and more granulation tissue now seen. No SOI. Recent bx was neg by dermatologist as per pt. Pt met with vascular surg. Dr. Kelley who recommended leg elevation/compression. Placed on po doxycycline for one wk for mild erythema which he is about to finish. Discussed importance of leg elevation/compression. Pt states he is wearing his compr. stockings daily. Pt states he has trouble keeping his leg elevated because of pain in the wound. Presently using a diuretic.      Presently using collagenase and recent culture revealed Serratia m. No susceptibility results on doxycycline. The wound is improving and may be ready soon for connor again and later to consider allografts which I discussed with pt. 2/20/25 left lower leg wound stable with less slough and more granulation. No SOI 2/26/25 left lower leg calf area tender and painful with standing and dorsiflexion. Wound  and No SOI. will send patient for Doppler to R/O DVT

## 2025-02-28 NOTE — PHYSICAL EXAM
[4 x 4] : 4 x 4  [Normal Thyroid] : the thyroid was normal [Normal Breath Sounds] : Normal breath sounds [Normal Heart Sounds] : normal heart sounds [Normal Rate and Rhythm] : normal rate and rhythm [Ankle Swelling (On Exam)] : present [Ankle Swelling On The Left] : moderate [Alert] : alert [Oriented to Person] : oriented to person [Oriented to Place] : oriented to place [Oriented to Time] : oriented to time [Calm] : calm [JVD] : no jugular venous distention  [Abdomen Masses] : No abdominal massess [Abdomen Tenderness] : ~T ~M No abdominal tenderness [Tender] : nontender [Enlarged] : not enlarged [de-identified] : elderly WM, NAD, alert, Ox3. [de-identified] : faint erythema and edema [FreeTextEntry1] : Left lateral lower leg  [FreeTextEntry2] : 7.1 [FreeTextEntry3] : 3.6 [FreeTextEntry4] : 0.2 [de-identified] : Serous/sanguinous [de-identified] : 50% [de-identified] : Own compression stockings  [de-identified] : collagenase  [de-identified] : Mechanically cleansed with sterile gauze and normal saline. Kerlix  [TWNoteComboBox4] : Moderate [TWNoteComboBox5] : No [TWNoteComboBox6] : Traumatic [de-identified] : No [de-identified] : Normal [de-identified] : None [de-identified] : None [de-identified] : 50% [de-identified] : Yes [de-identified] : 2.5% Lidocaine Topical [de-identified] : Other [de-identified] : Daily [de-identified] : Primary Dressing

## 2025-02-28 NOTE — VITALS
[Pain related to present condition?] : The patient's  pain is related to present condition. [Throbbing] : throbbing [Occasional] : occasional [] : No [de-identified] : 6/10  [FreeTextEntry3] : Left leg  [FreeTextEntry1] : Protective dressing  [FreeTextEntry2] : Direct contact  [FreeTextEntry4] : Protective dressing to prevent rubbing.

## 2025-02-28 NOTE — ASSESSMENT
[Home] : Home [Verbal] : Verbal [Written] : Written [Demo] : Demo [Patient] : Patient [Good - alert, interested, motivated] : Good - alert, interested, motivated [Verbalizes knowledge/Understanding] : Verbalizes knowledge/understanding [Dressing changes] : dressing changes [Skin Care] : skin care [Signs and symptoms of infection] : sign and symptoms of infection [Venous Disease] : venous disease [Nutrition] : nutrition [How and When to Call] : how and when to call [Pain Management] : pain management [Compression Therapy] : compression therapy [Patient responsibility to plan of care] : patient responsibility to plan of care [] : Yes [Stable] : stable [Other: ____] : [unfilled] [Ambulatory] : Ambulatory [Not Applicable - Long Term Care/Home Health Agency] : Long Term Care/Home Health Agency: Not Applicable [FreeTextEntry3] : Wound base is improving with Santyl [FreeTextEntry2] : Infection prevention Localized wound care  Goal remaining pain free regarding wounds Compression therapy  Enzymatic debridement  Sharp debridement [FreeTextEntry4] : Pt performs his own dressing changes and has supplies at home.  Follow up in 1 week

## 2025-02-28 NOTE — PHYSICAL EXAM
[4 x 4] : 4 x 4  [Normal Thyroid] : the thyroid was normal [Normal Breath Sounds] : Normal breath sounds [Normal Heart Sounds] : normal heart sounds [Normal Rate and Rhythm] : normal rate and rhythm [Ankle Swelling (On Exam)] : present [Ankle Swelling On The Left] : moderate [Alert] : alert [Oriented to Person] : oriented to person [Oriented to Place] : oriented to place [Oriented to Time] : oriented to time [Calm] : calm [JVD] : no jugular venous distention  [Abdomen Masses] : No abdominal massess [Abdomen Tenderness] : ~T ~M No abdominal tenderness [Tender] : nontender [Enlarged] : not enlarged [de-identified] : elderly WM, NAD, alert, Ox3. [de-identified] : faint erythema and edema [FreeTextEntry1] : Left lateral lower leg  [FreeTextEntry2] : 7.1 [FreeTextEntry3] : 3.6 [FreeTextEntry4] : 0.2 [de-identified] : Serous/sanguinous [de-identified] : 50% [de-identified] : Own compression stockings  [de-identified] : collagenase  [de-identified] : Mechanically cleansed with sterile gauze and normal saline. Kerlix  [TWNoteComboBox4] : Moderate [TWNoteComboBox5] : No [TWNoteComboBox6] : Traumatic [de-identified] : No [de-identified] : Normal [de-identified] : None [de-identified] : None [de-identified] : 50% [de-identified] : Yes [de-identified] : 2.5% Lidocaine Topical [de-identified] : Other [de-identified] : Daily [de-identified] : Primary Dressing

## 2025-02-28 NOTE — VITALS
[Pain related to present condition?] : The patient's  pain is related to present condition. [Throbbing] : throbbing [Occasional] : occasional [] : No [de-identified] : 6/10  [FreeTextEntry3] : Left leg  [FreeTextEntry1] : Protective dressing  [FreeTextEntry2] : Direct contact  [FreeTextEntry4] : Protective dressing to prevent rubbing.

## 2025-03-05 NOTE — REVIEW OF SYSTEMS
[Skin Wound] : skin wound [Negative] : Heme/Lymph [FreeTextEntry5] : hypertension, hyperlipidemia, mitral valve repair [FreeTextEntry8] : BPH [de-identified] : wound left lateral lower leg

## 2025-03-05 NOTE — PHYSICAL EXAM
[Normal Thyroid] : the thyroid was normal [Normal Breath Sounds] : Normal breath sounds [Normal Heart Sounds] : normal heart sounds [Normal Rate and Rhythm] : normal rate and rhythm [Ankle Swelling (On Exam)] : present [Ankle Swelling On The Left] : moderate [Alert] : alert [Oriented to Person] : oriented to person [Oriented to Place] : oriented to place [Oriented to Time] : oriented to time [Calm] : calm [4 x 4] : 4 x 4  [JVD] : no jugular venous distention  [Abdomen Masses] : No abdominal massess [Abdomen Tenderness] : ~T ~M No abdominal tenderness [Tender] : nontender [Enlarged] : not enlarged [de-identified] : elderly WM, NAD, alert, Ox3. [FreeTextEntry1] : Left lateral lower leg  [FreeTextEntry2] : 7.3 [FreeTextEntry3] : 3.3 [FreeTextEntry4] : 0.2 [de-identified] : Serous/sanguinous [de-identified] : faint erythema and edema [de-identified] : 20%  [de-identified] : Own compression stockings  [de-identified] : collagenase  [de-identified] : Mechanically cleansed with sterile gauze and normal saline. Kerlix  [TWNoteComboBox4] : Moderate [TWNoteComboBox5] : No [TWNoteComboBox6] : Traumatic [de-identified] : No [de-identified] : None [de-identified] : None [de-identified] : >75% [de-identified] : Yes [de-identified] : Other [de-identified] : Daily [de-identified] : Primary Dressing

## 2025-03-05 NOTE — ASSESSMENT
[Verbal] : Verbal [Written] : Written [Demo] : Demo [Patient] : Patient [Good - alert, interested, motivated] : Good - alert, interested, motivated [Verbalizes knowledge/Understanding] : Verbalizes knowledge/understanding [Dressing changes] : dressing changes [Skin Care] : skin care [Signs and symptoms of infection] : sign and symptoms of infection [Venous Disease] : venous disease [Nutrition] : nutrition [How and When to Call] : how and when to call [Pain Management] : pain management [Compression Therapy] : compression therapy [Patient responsibility to plan of care] : patient responsibility to plan of care [Stable] : stable [Ambulatory] : Ambulatory [Not Applicable - Long Term Care/Home Health Agency] : Long Term Care/Home Health Agency: Not Applicable [] : No [FreeTextEntry2] : Infection prevention Localized wound care  Goal remaining pain free regarding wounds Compression therapy  Enzymatic debridement  [FreeTextEntry3] : Wound base is improving with Santyl [FreeTextEntry4] : -Pt performs his own dressing changes and has supplies at home.  -Sent to [facility] Radiology to R/O DVT, DVT negative. -Follow up in 1 week  -Patient verbalized understanding of all discussed. Patient refused copy of wound care instructions. [Home] : Home

## 2025-03-05 NOTE — HISTORY OF PRESENT ILLNESS
[FreeTextEntry1] : 73 yo WM, here for f/u of chronic LLE traumatic wound. Has been using collagenase and more granulation tissue now seen. No SOI. Recent bx was neg by dermatologist as per pt. Pt met with vascular surg. Dr. Kelley who recommended leg elevation/compression. Placed on po doxycycline for one wk for mild erythema which he is about to finish. Discussed importance of leg elevation/compression. Pt states he is wearing his compr. stockings daily. Pt states he has trouble keeping his leg elevated because of pain in the wound. Presently using a diuretic.      Presently using collagenase and recent culture revealed Serratia m. No susceptibility results on doxycycline. The wound is improving and may be ready soon for connor again and later to consider allografts which I discussed with pt. 2/20/25 left lower leg wound stable with less slough and more granulation. No SOI 2/26/25 left lower leg calf area tender and painful with standing and dorsiflexion. Wound  and No SOI. will send patient for Doppler to R/O DVT 3/5/25 left lower leg wound ,smaller with better granulation. Doppler last week negative for DVT.No SOI

## 2025-03-05 NOTE — VITALS
[Pain related to present condition?] : The patient's  pain is related to present condition. [Throbbing] : throbbing [Occasional] : occasional [] : No [de-identified] : 4/10  [FreeTextEntry3] : Left leg and calf [FreeTextEntry1] : Protective dressing  [FreeTextEntry4] : Protective dressing to prevent rubbing.  [FreeTextEntry2] : Direct contact

## 2025-03-05 NOTE — PLAN
[FreeTextEntry1] : leg elevation collagenase, DD,  compression stockings qd complaint of area becoming painful at night.  f/u 1 wk  time spent 25 mins.

## 2025-03-12 NOTE — PHYSICAL EXAM
[4 x 4] : 4 x 4  [Normal Thyroid] : the thyroid was normal [Normal Breath Sounds] : Normal breath sounds [Normal Heart Sounds] : normal heart sounds [Normal Rate and Rhythm] : normal rate and rhythm [Alert] : alert [Oriented to Person] : oriented to person [Oriented to Place] : oriented to place [Oriented to Time] : oriented to time [Calm] : calm [JVD] : no jugular venous distention  [Abdomen Masses] : No abdominal massess [Abdomen Tenderness] : ~T ~M No abdominal tenderness [Tender] : nontender [Enlarged] : not enlarged [de-identified] : elderly WM, NAD, alert, Ox3. [FreeTextEntry1] : Left Lateral Lower Leg  [FreeTextEntry2] : 7.6 [FreeTextEntry3] : 3.5 [FreeTextEntry4] : 0.2 [de-identified] : Serosanguinous [de-identified] : 90% [de-identified] : 10%  [de-identified] : Own compression stockings  [de-identified] : Collagenase (Santyl) [de-identified] : Mechanically cleansed with sterile gauze and 0.9% normal saline. Dry Dressing Cloth Tape Kerlix Cloth Tape  ***Patient requested 4x4 over wound taped to leg then kerlix around leg taped with clean tape and a piece of cloth tape on top of the kerlix taped to his leg***  [TWNoteComboBox4] : Moderate [TWNoteComboBox5] : No [TWNoteComboBox6] : Traumatic [de-identified] : No [de-identified] : Erythema [de-identified] : None [de-identified] : None [de-identified] : Yes [de-identified] : Other [de-identified] : Daily [de-identified] : Primary Dressing

## 2025-03-12 NOTE — ASSESSMENT
[Verbal] : Verbal [Demo] : Demo [Patient] : Patient [Spouse] : Spouse [Good - alert, interested, motivated] : Good - alert, interested, motivated [Verbalizes knowledge/Understanding] : Verbalizes knowledge/understanding [Dressing changes] : dressing changes [Skin Care] : skin care [Signs and symptoms of infection] : sign and symptoms of infection [Venous Disease] : venous disease [Nutrition] : nutrition [How and When to Call] : how and when to call [Pain Management] : pain management [Compression Therapy] : compression therapy [Patient responsibility to plan of care] : patient responsibility to plan of care [Stable] : stable [Home] : Home [Ambulatory] : Ambulatory [Not Applicable - Long Term Care/Home Health Agency] : Long Term Care/Home Health Agency: Not Applicable [] : Yes [FreeTextEntry2] : Infection prevention Localized wound care  Goal remaining pain free regarding wounds Compression therapy  Enzymatic debridement  [FreeTextEntry4] : - MD assessed and advised to continue to treat as ordered.  - Pt had duplex scan on 2/26/25 which showed there is no DVT. - Pt performs his own dressing changes. Is low on supply. Some supplies given to ensure continuity of care. Supply order placed. - Patient verbalized understanding of all discussed. F/U 1 week

## 2025-03-12 NOTE — REVIEW OF SYSTEMS
[Skin Wound] : skin wound [Negative] : Psychiatric [FreeTextEntry5] : hypertension, hyperlipidemia, mitral valve repair [FreeTextEntry8] : BPH [de-identified] : wound left lateral lower leg

## 2025-03-12 NOTE — VITALS
[Pain related to present condition?] : The patient's  pain is related to present condition. [Throbbing] : throbbing [Occasional] : occasional [] : No [de-identified] : 4/10  [FreeTextEntry3] : Left leg and calf [FreeTextEntry1] : Protective dressing  [FreeTextEntry2] : Direct contact  [FreeTextEntry4] : Protective dressing to prevent rubbing.

## 2025-03-12 NOTE — HISTORY OF PRESENT ILLNESS
[FreeTextEntry1] : 73 yo WM, here for f/u of chronic LLE traumatic wound. Has been using collagenase and more granulation tissue now seen. No SOI. Recent bx was neg by dermatologist as per pt. Pt met with vascular surg. Dr. Kelley who recommended leg elevation/compression. Placed on po doxycycline recently. Discussed importance of leg elevation/compression. Pt states he is wearing his compr. stockings daily. Pt states he has trouble keeping his leg elevated because of pain in the wound. Presently using a diuretic.  Presently using collagenase and recent culture revealed Serratia m. No susceptibility results on doxycycline. The wound is improving and may be ready soon for connor again.

## 2025-03-19 NOTE — ASSESSMENT
[Verbal] : Verbal [Demo] : Demo [Patient] : Patient [Spouse] : Spouse [Good - alert, interested, motivated] : Good - alert, interested, motivated [Verbalizes knowledge/Understanding] : Verbalizes knowledge/understanding [Dressing changes] : dressing changes [Skin Care] : skin care [Signs and symptoms of infection] : sign and symptoms of infection [Venous Disease] : venous disease [Nutrition] : nutrition [How and When to Call] : how and when to call [Pain Management] : pain management [Compression Therapy] : compression therapy [Patient responsibility to plan of care] : patient responsibility to plan of care [Stable] : stable [Home] : Home [Ambulatory] : Ambulatory [Not Applicable - Long Term Care/Home Health Agency] : Long Term Care/Home Health Agency: Not Applicable [] : No [FreeTextEntry2] : Infection prevention Localized wound care  Goal remaining pain free regarding wounds Compression therapy  Enzymatic debridement  [FreeTextEntry4] : - MD assessed and advised to continue to treat as ordered, less fibrinous tissue versus last weeks assessment. Continue Collagenase, if the wound does not improve over the next few weeks, discussed biopsy of site. Biopsy was originally performed in November by Dermatologist and biopsy was negative for malignancy.  - Pt had duplex scan on 2/26/25 which showed there is no DVT. - Pt performs his own dressing changes. Is low on supply. Some supplies given to ensure continuity of care. Supply order placed on previous visit. - Patient verbalized understanding of all discussed. -F/U 1 week

## 2025-03-19 NOTE — HISTORY OF PRESENT ILLNESS
[FreeTextEntry1] : 71 yo WM, here for f/u of chronic LLE traumatic wound. Has been using collagenase and more granulation tissue now seen. No SOI. Recent bx was neg by dermatologist as per pt. Pt met with vascular surg. Dr. Kelley who recommended leg elevation/compression. Placed on po doxycycline recently. Discussed importance of leg elevation/compression. Pt states he is wearing his compr. stockings daily. Pt states he has trouble keeping his leg elevated because of pain in the wound. Presently using a diuretic.  Presently using collagenase and recent culture revealed Serratia m. No susceptibility results on doxycycline. The wound is improving and may be ready soon for connor again.  3/19/25 wound has improved. Increased granulation and decreased fibrotic tissue. No SOI. will consider re biopsy if wound not improving

## 2025-03-19 NOTE — VITALS
[Pain related to present condition?] : The patient's  pain is related to present condition. [Throbbing] : throbbing [Occasional] : occasional [] : No [de-identified] : 4/10  [FreeTextEntry3] : Left leg and calf [FreeTextEntry1] : Protective dressing  [FreeTextEntry2] : Direct contact  [FreeTextEntry4] : Protective dressing to prevent rubbing.

## 2025-03-19 NOTE — REVIEW OF SYSTEMS
[Skin Wound] : skin wound [Negative] : Psychiatric [FreeTextEntry5] : hypertension, hyperlipidemia, mitral valve repair [FreeTextEntry8] : BPH [de-identified] : wound left lateral lower leg

## 2025-03-19 NOTE — PHYSICAL EXAM
[4 x 4] : 4 x 4  [Normal Thyroid] : the thyroid was normal [Normal Breath Sounds] : Normal breath sounds [Normal Heart Sounds] : normal heart sounds [Normal Rate and Rhythm] : normal rate and rhythm [Alert] : alert [Oriented to Person] : oriented to person [Oriented to Place] : oriented to place [Oriented to Time] : oriented to time [Calm] : calm [JVD] : no jugular venous distention  [Abdomen Masses] : No abdominal massess [Abdomen Tenderness] : ~T ~M No abdominal tenderness [Tender] : nontender [Enlarged] : not enlarged [de-identified] : elderly WM, NAD, alert, Ox3. [FreeTextEntry1] : Left Lateral Lower Leg  [FreeTextEntry2] : 7.6 [FreeTextEntry3] : 3.5 [FreeTextEntry4] : 0.2 [de-identified] : Serosanguinous [de-identified] : 95% [de-identified] : 5%- improving 3/19/25 [de-identified] : Own compression stockings  [de-identified] : Collagenase (Santyl) [de-identified] : Mechanically cleansed with sterile gauze and 0.9% normal saline. Dry Dressing Kerlix Cloth Tape  ***Patient requested 4x4 over wound taped to leg then kerlix around leg taped with clean tape and a piece of cloth tape on top of the kerlix taped to his leg***  [TWNoteComboBox4] : Moderate [TWNoteComboBox5] : No [de-identified] : No [TWNoteComboBox6] : Traumatic [de-identified] : Erythema [de-identified] : None [de-identified] : None [de-identified] : Yes [de-identified] : Other [de-identified] : Daily [de-identified] : Primary Dressing

## 2025-03-27 NOTE — ASSESSMENT
[Verbal] : Verbal [Demo] : Demo [Patient] : Patient [Spouse] : Spouse [Good - alert, interested, motivated] : Good - alert, interested, motivated [Verbalizes knowledge/Understanding] : Verbalizes knowledge/understanding [Dressing changes] : dressing changes [Skin Care] : skin care [Signs and symptoms of infection] : sign and symptoms of infection [Venous Disease] : venous disease [Nutrition] : nutrition [How and When to Call] : how and when to call [Pain Management] : pain management [Compression Therapy] : compression therapy [Patient responsibility to plan of care] : patient responsibility to plan of care [] : Yes [Stable] : stable [Home] : Home [Ambulatory] : Ambulatory [Not Applicable - Long Term Care/Home Health Agency] : Long Term Care/Home Health Agency: Not Applicable [FreeTextEntry2] : Infection prevention Localized wound care  Goal remaining pain free regarding wounds Compression therapy  Enzymatic debridementt Pt had duplex scan on 2/26/25 which showed there is no DVT. [FreeTextEntry4] : -Advised patient to elevate lower legs for edema control. ACE wrap applied, advised to use ACE wrap instead of compression stocking this week. Patient reports that he has not taken his diuretic, advised he should take medication as prescribed. - MD assessed and advised to continue to treat as ordered, less fibrinous tissue versus last weeks assessment. Continue Collagenase, if the wound does not improve over the next few weeks, discussed biopsy of site. Biopsy was originally performed in November by Dermatologist and biopsy was negative for malignancy.  -Authorization for sharp debridement placed, for possible debridement next visit. - Pt performs his own dressing changes. Is low on supply. Some supplies given to ensure continuity of care. Supply order placed on previous visit. - Patient verbalized understanding of all discussed. -F/U 1 week

## 2025-03-27 NOTE — REVIEW OF SYSTEMS
[Skin Wound] : skin wound [Negative] : Psychiatric [FreeTextEntry8] : BPH [FreeTextEntry5] : hypertension, hyperlipidemia, mitral valve repair [de-identified] : wound left lateral lower leg

## 2025-03-27 NOTE — PHYSICAL EXAM
[4 x 4] : 4 x 4  [JVD] : no jugular venous distention  [Normal Thyroid] : the thyroid was normal [Normal Breath Sounds] : Normal breath sounds [Normal Heart Sounds] : normal heart sounds [Normal Rate and Rhythm] : normal rate and rhythm [Abdomen Masses] : No abdominal massess [Abdomen Tenderness] : ~T ~M No abdominal tenderness [Tender] : nontender [Enlarged] : not enlarged [Alert] : alert [Oriented to Person] : oriented to person [Oriented to Place] : oriented to place [Oriented to Time] : oriented to time [Calm] : calm [de-identified] : elderly WM, NAD, alert, Ox3. [FreeTextEntry1] : Left Lateral Lower Leg  [FreeTextEntry2] : 7.6 [FreeTextEntry3] : 3.5 [FreeTextEntry4] : 0.2 [de-identified] : Serosanguinous [de-identified] : mild, consistent with venous stasis [de-identified] : 95% [de-identified] : 5%- improving 3/27/25 [de-identified] :  Post compression placement assessment: -circulation WNL -patient states full comfort and full ROM -two fingers can slide in when assessed [de-identified] : Collagenase (Santyl) [de-identified] : Mechanically cleansed with sterile gauze and 0.9% normal saline. Dry Dressing Kerlix Cloth Tape  ***Patient requested 4x4 over wound taped to leg then kerlix around leg taped with clean tape and a piece of cloth tape on top of the kerlix taped to his leg***   +3 pitting edema of LLE, ACE wrap applied and advised he should take all medication as prescribed. [TWNoteComboBox4] : Moderate [TWNoteComboBox5] : No [TWNoteComboBox6] : Traumatic [de-identified] : No [de-identified] : Erythema [de-identified] : None [de-identified] : None [de-identified] : Yes [de-identified] : Ace wraps [de-identified] : Daily [de-identified] : Primary Dressing

## 2025-03-27 NOTE — VITALS
[Pain related to present condition?] : The patient's  pain is related to present condition. [Throbbing] : throbbing [Occasional] : occasional [] : No [de-identified] : 4/10  [FreeTextEntry3] : Left leg and calf [FreeTextEntry1] : Protective dressing  [FreeTextEntry2] : Direct contact  [FreeTextEntry4] : Protective dressing to prevent rubbing.

## 2025-03-27 NOTE — HISTORY OF PRESENT ILLNESS
[FreeTextEntry1] : 73 yo WM, here for f/u of chronic LLE traumatic wound. Has been using collagenase and more granulation tissue now seen. No SOI. Recent bx was neg by dermatologist as per pt. Pt met with vascular surg. Dr. Kelley who recommended leg elevation/compression. Placed on po doxycycline recently. Discussed importance of leg elevation/compression. Pt states he is wearing his compr. stockings daily. Pt states he has trouble keeping his leg elevated because of pain in the wound. Presently using a diuretic.  Presently using collagenase and recent culture revealed Serratia m. No susceptibility results on doxycycline. The wound is improving and may be ready soon for connor again.  3/19/25 wound has improved. Increased granulation and decreased fibrotic tissue. No SOI. will consider re biopsy if wound not improving 3/27/25 left lateral lower leg wound  and smaller. No SOI Still unable to return to work. no available place to elevate lower legs

## 2025-04-01 NOTE — CARDIOLOGY SUMMARY
[de-identified] : Sinus bradycardia.  [de-identified] : 5/2022 - Mitral Valve annuplasty ring seen. minimal mitral regurgitation. EF 55 - 60% [de-identified] : 8/2019 Carotid Doppler - mild plaque\par  5/2022 - Carotid Doppler - mild plaque to Left and right carotid [Normal] : normal [___] : [unfilled] [LVEF ___%] : LVEF [unfilled]% [___] : [unfilled]

## 2025-04-01 NOTE — REVIEW OF SYSTEMS
[Hand Pain] : hand pain [Rash] : no rash [Depression] : no depression [Anxiety] : no anxiety [Easy Bleeding] : no tendency for easy bleeding [Easy Bruising] : no tendency for easy bruising [Negative] : Heme/Lymph [FreeTextEntry5] : See HPI  [de-identified] : Vertigo

## 2025-04-01 NOTE — DISCUSSION/SUMMARY
[FreeTextEntry1] : 71 y/o male with PMhx as noted who presents to the office for a follow up evaluation.   His EKG unchanged. He should continue a daily baby aspirin.  He should be better about taking it daily.  He does not want to take Crestor.   His BP is mostly controlled, but he does have spikes related to when he takes his medications.   He will continue nebivolol 10 QD and nifedipine 60 QD.  He is taking low dose Lasix, to be taken as needed for mild ankle edema.  We discussed importance of healthy eating and daily exercise to reduce the risk of future cardiac events.    He will follow in three months.  He knows to call the office with any issues.  I am repeating an echocardiogram.  [EKG obtained to assist in diagnosis and management of assessed problem(s)] : EKG obtained to assist in diagnosis and management of assessed problem(s)

## 2025-04-03 NOTE — VITALS
[Pain related to present condition?] : The patient's  pain is related to present condition. [Throbbing] : throbbing [Occasional] : occasional [] : No [de-identified] : 4/10  [FreeTextEntry3] : Left leg and calf [FreeTextEntry1] : Protective dressing  [FreeTextEntry2] : Direct contact  [FreeTextEntry4] : Protective dressing to prevent rubbing.

## 2025-04-03 NOTE — ASSESSMENT
[Verbal] : Verbal [Demo] : Demo [Patient] : Patient [Spouse] : Spouse [Dressing changes] : dressing changes [Skin Care] : skin care [Signs and symptoms of infection] : sign and symptoms of infection [Venous Disease] : venous disease [Nutrition] : nutrition [How and When to Call] : how and when to call [Pain Management] : pain management [Compression Therapy] : compression therapy [Patient responsibility to plan of care] : patient responsibility to plan of care [Stable] : stable [Home] : Home [Ambulatory] : Ambulatory [Not Applicable - Long Term Care/Home Health Agency] : Long Term Care/Home Health Agency: Not Applicable [Fair - mild discomfort, physical impairment, low acceptance] : Fair - mild discomfort, physical impairment, low acceptance [Needs reinforcement] : needs reinforcement [Labs and Tests] : labs and tests [Other: ____] : [unfilled] [] : No [FreeTextEntry2] : Infection prevention Localized wound care  Goal remaining pain free regarding wounds Compression therapy  Sharp debridement Pt had duplex scan on 2/26/25 which showed there is no DVT. [FreeTextEntry3] : Increase in fibrinous tissue [FreeTextEntry4] : -Sharp debridement performed during today's visit, authorization was in chart. Tolerated procedure well. Changed topical management to  Silver Alginate. -Advised patient to elevate lower legs for edema control. Patient reports that he has been compliant with his diuretic use, advised he should take medication as prescribed. -Discussed if the wound does not improve over the next few weeks, discussed biopsy of site. Biopsy was originally performed in November by Dermatologist and biopsy was negative for malignancy.  - Pt performs his own dressing changes. Some supplies given to ensure continuity of care. - Patient verbalized understanding of all discussed.  Advised to keep dressing in place, patient reports that he was keeping dressing off for 12 hours to allow scab formation and applied a small amount of peroxide to wound base. Re-educated patient on importance of leaving dressing in place. -F/U 1 week

## 2025-04-03 NOTE — PHYSICAL EXAM
[4 x 4] : 4 x 4  [FreeTextEntry1] : Left Lateral Lower Leg  [FreeTextEntry2] : 7.3 [FreeTextEntry3] : 3.2 [FreeTextEntry4] : 0.2-0.3 [de-identified] : Serosanguinous [de-identified] : mild, consistent with venous stasis [de-identified] : 75% [de-identified] : 25%- increased from previous assessment [de-identified] : Pathology obtained @ 11:40 [de-identified] :  Silver Alginate [de-identified] : Mechanically cleansed with sterile gauze and 0.9% normal saline. Dry Dressing Kerlix Cloth Tape  Post debridement measurement: 7.5cmx3.3cmx0.3cm  ***Patient requested 4x4 over wound taped to leg then kerlix around leg taped with clean tape and a piece of cloth tape on top of the kerlix taped to his leg***  [TWNoteComboBox4] : Moderate [TWNoteComboBox5] : No [TWNoteComboBox6] : Traumatic [de-identified] : No [de-identified] : Erythema [de-identified] : None [de-identified] : None [de-identified] : Yes [de-identified] : 2.5% Lidocaine Topical [TWNoteComboBox7] : [place] [de-identified] : Biopsy taken and sent for pathology [de-identified] : Every other day [de-identified] : Primary Dressing

## 2025-04-03 NOTE — PROCEDURE
[Saline] : saline [Topical Lidocaine] : topical lidocaine  [Necrotic] : necrotic [Sharp curette] : sharp curette [Skin] : skin [Fat] : fat [Subcutaneous tissue] : subcutaneous tissue [Sent to Lab] : which was entirely removed and was sent to the laboratory for [Pathologic Exam] : pathologic exam [Clean] : clean [Pressure] : pressure [FreeTextEntry2] : left lateral lower leg [FreeTextEntry1] : AgAlginate,DD,QOD,Ace wrap [] : Yes [FreeTextEntry9] : 11:55 [de-identified] : wound left lower leg [de-identified] : caroline cabrera [FreeTextEntry6] : wound with necrotic tissue left lower leg [FreeTextEntry7] : same [de-identified] : topical lidocaine [de-identified] : 2 ml [de-identified] : yes

## 2025-04-10 NOTE — REVIEW OF SYSTEMS
[Skin Wound] : skin wound [Negative] : Psychiatric [FreeTextEntry5] : hypertension, hyperlipidemia, mitral valve repair [FreeTextEntry8] : BPH [de-identified] : wound left lateral lower leg

## 2025-04-10 NOTE — ASSESSMENT
[Verbal] : Verbal [Demo] : Demo [Patient] : Patient [Spouse] : Spouse [Fair - mild discomfort, physical impairment, low acceptance] : Fair - mild discomfort, physical impairment, low acceptance [Needs reinforcement] : needs reinforcement [Dressing changes] : dressing changes [Skin Care] : skin care [Signs and symptoms of infection] : sign and symptoms of infection [Venous Disease] : venous disease [Nutrition] : nutrition [How and When to Call] : how and when to call [Pain Management] : pain management [Compression Therapy] : compression therapy [Patient responsibility to plan of care] : patient responsibility to plan of care [Other: ____] : [unfilled] [Stable] : stable [Home] : Home [Ambulatory] : Ambulatory [Not Applicable - Long Term Care/Home Health Agency] : Long Term Care/Home Health Agency: Not Applicable [] : Yes [FreeTextEntry2] : Infection Prevention Wound care and Dressing changes Promote and Restore optimal skin integrity Nutrition and Wound Healing  Offloading and Pressure relief Protect and Guard wound site  Maintain acceptable levels of Pain Compliance R/T Compression therapy Compliance R/T Elevation of Lower Extremities [FreeTextEntry4] : MD assessed wound site and Pathology results discussed with patient.  Reinforced importance of compression daily. Patient verbalized understanding of all discussed. Patient to return to [facility] in One Week  Small amount of supplies provided. Wound care supplies requested.   Task sent.

## 2025-04-10 NOTE — PHYSICAL EXAM
[4 x 4] : 4 x 4  [Normal Thyroid] : the thyroid was normal [Normal Breath Sounds] : Normal breath sounds [Normal Heart Sounds] : normal heart sounds [Normal Rate and Rhythm] : normal rate and rhythm [Alert] : alert [Oriented to Person] : oriented to person [Oriented to Place] : oriented to place [Oriented to Time] : oriented to time [Calm] : calm [JVD] : no jugular venous distention  [Abdomen Masses] : No abdominal massess [Abdomen Tenderness] : ~T ~M No abdominal tenderness [Tender] : nontender [Enlarged] : not enlarged [de-identified] : elderly WM, NAD, alert, Ox3. [de-identified] : Mid portion of wound bed hyper-granular - cauterized with Silver Nitrate x1 4/10/25 [FreeTextEntry1] : Left Lateral Lower Leg  [FreeTextEntry2] : 7.3 [FreeTextEntry3] : 3.2 [FreeTextEntry4] : 0.2 [de-identified] : Serosanguinous [de-identified] : mild, consistent with venous stasis [de-identified] : 75% [de-identified] : 20% [de-identified] : Circulation and Neuromuscular assessment done post application. [de-identified] :  Alginate Ag [de-identified] : Mechanically cleansed with sterile gauze and 0.9% normal saline.  Kerlix Cloth Tape   [TWNoteComboBox4] : Moderate [TWNoteComboBox5] : No [TWNoteComboBox6] : Traumatic [de-identified] : No [de-identified] : Erythema [de-identified] : None [de-identified] : None [de-identified] : Yes [de-identified] : False [TWNoteComboBox7] : False [de-identified] : False [de-identified] : Ace wraps [de-identified] : Every other day [de-identified] : Primary Dressing

## 2025-04-10 NOTE — HISTORY OF PRESENT ILLNESS
[FreeTextEntry1] : 71 yo WM, here for f/u of chronic LLE traumatic wound. Has been using collagenase and more granulation tissue now seen. No SOI. Recent bx was neg by dermatologist as per pt. Pt met with vascular surg. Dr. Kelley who recommended leg elevation/compression. Placed on po doxycycline recently. Discussed importance of leg elevation/compression. Pt states he is wearing his compr. stockings daily. Pt states he has trouble keeping his leg elevated because of pain in the wound. Presently using a diuretic.  Presently using collagenase and recent culture revealed Serratia m. No susceptibility results on doxycycline. The wound is improving and may be ready soon for connor again.  3/19/25 wound has improved. Increased granulation and decreased fibrotic tissue. No SOI. will consider re biopsy if wound not improving 3/27/25 left lateral lower leg wound  and smaller. No SOI Still unable to return to work. no available place to elevate lower legs 4/10/25 1 week S/P debridement left lateral lower leg. Path report inflammatory necrotic tissue and epithelium. No SOI Central wound hypertrophic granulation treated with AgNO3  1 stick

## 2025-04-17 NOTE — REVIEW OF SYSTEMS
[Skin Wound] : skin wound [Negative] : Psychiatric [FreeTextEntry5] : hypertension, hyperlipidemia, mitral valve repair [FreeTextEntry8] : BPH [de-identified] : wound left lateral lower leg

## 2025-04-17 NOTE — PHYSICAL EXAM
[4 x 4] : 4 x 4  [JVD] : no jugular venous distention  [Normal Thyroid] : the thyroid was normal [Normal Breath Sounds] : Normal breath sounds [Normal Heart Sounds] : normal heart sounds [Normal Rate and Rhythm] : normal rate and rhythm [Abdomen Masses] : No abdominal massess [Abdomen Tenderness] : ~T ~M No abdominal tenderness [Tender] : nontender [Enlarged] : not enlarged [Alert] : alert [Oriented to Person] : oriented to person [Oriented to Place] : oriented to place [Oriented to Time] : oriented to time [Calm] : calm [de-identified] : elderly WM, NAD, alert, Ox3. [FreeTextEntry1] : Left Lateral Lower Leg  [FreeTextEntry2] : 8.0cm [FreeTextEntry3] : 3.0cm [FreeTextEntry4] : 0.2cm [de-identified] : Serosanguinous [de-identified] : mild, consistent with venous stasis [de-identified] : Circulation and Neuromuscular assessment done post application. [de-identified] :  Alginate Ag [de-identified] : Mechanically cleansed with sterile gauze and 0.9% normal saline.  Cloth Tape  Mid portion of wound bed hyper-granular MD cauterized with Silver Nitrate x1  [TWNoteComboBox4] : Moderate [TWNoteComboBox5] : No [TWNoteComboBox6] : Traumatic [de-identified] : No [de-identified] : Erythema [de-identified] : None [de-identified] : None [de-identified] : 100% [de-identified] : No [de-identified] : Ace wraps [de-identified] : Every other day [de-identified] : Primary Dressing

## 2025-04-17 NOTE — HISTORY OF PRESENT ILLNESS
[FreeTextEntry1] : 73 yo WM, here for f/u of chronic LLE traumatic wound. Has been using collagenase and more granulation tissue now seen. No SOI. Recent bx was neg by dermatologist as per pt. Pt met with vascular surg. Dr. Kelley who recommended leg elevation/compression. Placed on po doxycycline recently. Discussed importance of leg elevation/compression. Pt states he is wearing his compr. stockings daily. Pt states he has trouble keeping his leg elevated because of pain in the wound. Presently using a diuretic.  Presently using collagenase and recent culture revealed Serratia m. No susceptibility results on doxycycline. The wound is improving and may be ready soon for connor again.  3/19/25 wound has improved. Increased granulation and decreased fibrotic tissue. No SOI. will consider re biopsy if wound not improving 3/27/25 left lateral lower leg wound  and smaller. No SOI Still unable to return to work. no available place to elevate lower legs 4/10/25 1 week S/P debridement left lateral lower leg. Path report inflammatory necrotic tissue and epithelium. No SOI Central wound hypertrophic granulation treated with AgNO3  1 stick 4/17/25 left lateral lower leg appears smaller and . 1 stick AgNO3 to hypertrophic granulation. No SOI

## 2025-04-17 NOTE — PLAN
[FreeTextEntry1] : AgAlginate,DD qod, compression  AgNO3 1 stick to hypertrophic granulation f/u 1 wk  time spent 25mins.

## 2025-04-17 NOTE — ASSESSMENT
[Verbal] : Verbal [Demo] : Demo [Patient] : Patient [Spouse] : Spouse [Dressing changes] : dressing changes [Skin Care] : skin care [Signs and symptoms of infection] : sign and symptoms of infection [Venous Disease] : venous disease [Nutrition] : nutrition [How and When to Call] : how and when to call [Pain Management] : pain management [Compression Therapy] : compression therapy [Patient responsibility to plan of care] : patient responsibility to plan of care [Stable] : stable [Home] : Home [Ambulatory] : Ambulatory [Not Applicable - Long Term Care/Home Health Agency] : Long Term Care/Home Health Agency: Not Applicable [Good - alert, interested, motivated] : Good - alert, interested, motivated [Verbalizes knowledge/Understanding] : Verbalizes knowledge/understanding [] : No [FreeTextEntry2] : Infection Prevention Wound care and Dressing changes Promote and Restore optimal skin integrity Nutrition and Wound Healing  Offloading and Pressure relief Protect and Guard wound site  Maintain acceptable levels of Pain Compliance R/T Compression therapy Compliance R/T Elevation of Lower Extremities [FreeTextEntry4] : Patient's spouse performs dressing changes, no supplies needed. Reinforced importance of compression daily. Patient verbalized understanding of all discussed. Patient to return to [facility] in One Week

## 2025-04-24 NOTE — ASSESSMENT
[Verbal] : Verbal [Demo] : Demo [Patient] : Patient [Good - alert, interested, motivated] : Good - alert, interested, motivated [Verbalizes knowledge/Understanding] : Verbalizes knowledge/understanding [Dressing changes] : dressing changes [Skin Care] : skin care [Signs and symptoms of infection] : sign and symptoms of infection [Venous Disease] : venous disease [Nutrition] : nutrition [How and When to Call] : how and when to call [Pain Management] : pain management [Compression Therapy] : compression therapy [Patient responsibility to plan of care] : patient responsibility to plan of care [Stable] : stable [Home] : Home [Ambulatory] : Ambulatory [Not Applicable - Long Term Care/Home Health Agency] : Long Term Care/Home Health Agency: Not Applicable [] : No [FreeTextEntry2] : Infection Prevention Wound care and Dressing changes Promote and Restore optimal skin integrity Nutrition and Wound Healing  Offloading and Pressure relief Protect and Guard wound site  Maintain acceptable levels of Pain Compliance R/T Compression therapy Compliance R/T Elevation of Lower Extremities [FreeTextEntry3] : Increased Erythema, Warmth noted at corinna-wound [FreeTextEntry4] : MD assessed wound site -  Culture taken 10:54am - sent to lab Doxycycline e-scribed to pharm - info confirmed.  Patient's spouse performs dressing changes. Supplies received.  Patient verbalized understanding of all discussed. Patient to return to [facility] in One Week

## 2025-04-24 NOTE — REVIEW OF SYSTEMS
[Skin Wound] : skin wound [Negative] : Psychiatric [FreeTextEntry5] : hypertension, hyperlipidemia, mitral valve repair [FreeTextEntry8] : BPH [de-identified] : wound left lateral lower leg

## 2025-04-24 NOTE — PLAN
[FreeTextEntry1] : left lateral lower leg wound AgAlginate,DD qod, compression AgNO3 1 stick to hypertrophic granulation wound culture doxycycline started f/u 1 wk  time spent 25mins.

## 2025-04-24 NOTE — PHYSICAL EXAM
[4 x 4] : 4 x 4  [JVD] : no jugular venous distention  [Normal Thyroid] : the thyroid was normal [Normal Breath Sounds] : Normal breath sounds [Normal Heart Sounds] : normal heart sounds [Normal Rate and Rhythm] : normal rate and rhythm [Abdomen Masses] : No abdominal massess [Abdomen Tenderness] : ~T ~M No abdominal tenderness [Tender] : nontender [Enlarged] : not enlarged [Alert] : alert [Oriented to Person] : oriented to person [Oriented to Place] : oriented to place [Oriented to Time] : oriented to time [Calm] : calm [de-identified] : elderly WM, NAD, alert, Ox3. [FreeTextEntry1] : Left Lateral Lower Leg  [FreeTextEntry2] : 8.0 [FreeTextEntry3] : 3.0 [FreeTextEntry4] : 0.2 [de-identified] : Serosanguinous [de-identified] : Warmth [de-identified] : @ 7413 [de-identified] : Circulation and Neuromuscular assessment done post application. [de-identified] :  Alginate Ag [de-identified] : Mechanically cleansed with 0.9% Normal Saline  Cloth Tape  Mid portion of wound bed hyper-granular, as previous 2 visits -  MD Meek cauterized with Silver Nitrate stick x1.   Personal Compression Stocking [TWNoteComboBox4] : Moderate [TWNoteComboBox5] : No [TWNoteComboBox6] : Traumatic [de-identified] : No [de-identified] : Erythema [de-identified] : None [de-identified] : None [de-identified] : 100% [de-identified] : No [de-identified] : Cultures obtained [de-identified] : Every other day [de-identified] : Primary Dressing

## 2025-04-24 NOTE — VITALS
[] : No [de-identified] : 2/10 [FreeTextEntry3] : Left lower leg, below wound site  [FreeTextEntry1] : rest, elevation [FreeTextEntry2] : "Pain just started a few day ago"  [FreeTextEntry4] : rest, elevation

## 2025-04-24 NOTE — HISTORY OF PRESENT ILLNESS
[FreeTextEntry1] : 71 yo WM, here for f/u of chronic LLE traumatic wound. Has been using collagenase and more granulation tissue now seen. No SOI. Recent bx was neg by dermatologist as per pt. Pt met with vascular surg. Dr. Kelley who recommended leg elevation/compression. Placed on po doxycycline recently. Discussed importance of leg elevation/compression. Pt states he is wearing his compr. stockings daily. Pt states he has trouble keeping his leg elevated because of pain in the wound. Presently using a diuretic.  Presently using collagenase and recent culture revealed Serratia m. No susceptibility results on doxycycline. The wound is improving and may be ready soon for connor again.  3/19/25 wound has improved. Increased granulation and decreased fibrotic tissue. No SOI. will consider re biopsy if wound not improving 3/27/25 left lateral lower leg wound  and smaller. No SOI Still unable to return to work. no available place to elevate lower legs 4/10/25 1 week S/P debridement left lateral lower leg. Path report inflammatory necrotic tissue and epithelium. No SOI Central wound hypertrophic granulation treated with AgNO3  1 stick 4/17/25 left lateral lower leg appears smaller and . 1 stick AgNO3 to hypertrophic granulation. No SOI 4/24/25 left lateral lower leg wound about the same size. Hypertrophic granulation addressed with 1 stick of AgNO3. Drainage brownish and periwound with slight increase in warmth. Does not look infected bur wound cultured and doxycycline started.

## 2025-05-01 NOTE — PLAN
[FreeTextEntry1] : left lateral lower leg wound AgAlginate,DD qod, compression AgNO3 1 stick to hypertrophic granulation doxycycline finish f/u 1 wk  time spent 25mins.

## 2025-05-01 NOTE — PHYSICAL EXAM
[4 x 4] : 4 x 4  [Normal Thyroid] : the thyroid was normal [Normal Breath Sounds] : Normal breath sounds [Normal Heart Sounds] : normal heart sounds [Normal Rate and Rhythm] : normal rate and rhythm [Alert] : alert [Oriented to Person] : oriented to person [Oriented to Place] : oriented to place [Oriented to Time] : oriented to time [Calm] : calm [JVD] : no jugular venous distention  [Abdomen Masses] : No abdominal massess [Abdomen Tenderness] : ~T ~M No abdominal tenderness [Tender] : nontender [Enlarged] : not enlarged [de-identified] : elderly WM, NAD, alert, Ox3. [FreeTextEntry1] : Left Lateral Lower Leg  [FreeTextEntry2] : 8.5 [FreeTextEntry3] : 3.4 [FreeTextEntry4] : 0.2 [de-identified] : Serosanguinous [de-identified] : intact [de-identified] : central area of hypertrophic tissue cauterized with silver nitrate by MD [de-identified] : Circulation and Neuromuscular assessment done post application. [de-identified] :  Alginate Ag [de-identified] : Mechanically cleansed with 0.9% Normal Saline  Cloth Tape  Personal Compression Stocking  [TWNoteComboBox3] : FT [TWNoteComboBox4] : Moderate [TWNoteComboBox5] : No [TWNoteComboBox6] : Traumatic [de-identified] : No [de-identified] : other [de-identified] : None [de-identified] : None [de-identified] : 100% [de-identified] : No [de-identified] : False [de-identified] : Every other day [de-identified] : Primary Dressing

## 2025-05-01 NOTE — HISTORY OF PRESENT ILLNESS
[FreeTextEntry1] : 73 yo WM, here for f/u of chronic LLE traumatic wound. Has been using collagenase and more granulation tissue now seen. No SOI. Recent bx was neg by dermatologist as per pt. Pt met with vascular surg. Dr. Kelley who recommended leg elevation/compression. Placed on po doxycycline recently. Discussed importance of leg elevation/compression. Pt states he is wearing his compr. stockings daily. Pt states he has trouble keeping his leg elevated because of pain in the wound. Presently using a diuretic.  Presently using collagenase and recent culture revealed Serratia m. No susceptibility results on doxycycline. The wound is improving and may be ready soon for connor again.  3/19/25 wound has improved. Increased granulation and decreased fibrotic tissue. No SOI. will consider re biopsy if wound not improving 3/27/25 left lateral lower leg wound  and smaller. No SOI Still unable to return to work. no available place to elevate lower legs 4/10/25 1 week S/P debridement left lateral lower leg. Path report inflammatory necrotic tissue and epithelium. No SOI Central wound hypertrophic granulation treated with AgNO3  1 stick 4/17/25 left lateral lower leg appears smaller and . 1 stick AgNO3 to hypertrophic granulation. No SOI 4/24/25 left lateral lower leg wound about the same size. Hypertrophic granulation addressed with 1 stick of AgNO3. Drainage brownish and periwound with slight increase in warmth. Does not look infected bur wound cultured and doxycycline started. 5/1/25 left lateral lower leg wound smaller. Less hypertrophic granulation. 1 stick of AgNO3 used. Less drainage. No SOI

## 2025-05-01 NOTE — REVIEW OF SYSTEMS
[Skin Wound] : skin wound [Negative] : Psychiatric [FreeTextEntry5] : hypertension, hyperlipidemia, mitral valve repair [FreeTextEntry8] : BPH [de-identified] : wound left lateral lower leg

## 2025-05-01 NOTE — ASSESSMENT
[Verbal] : Verbal [Demo] : Demo [Patient] : Patient [Good - alert, interested, motivated] : Good - alert, interested, motivated [Verbalizes knowledge/Understanding] : Verbalizes knowledge/understanding [Dressing changes] : dressing changes [Skin Care] : skin care [Signs and symptoms of infection] : sign and symptoms of infection [Venous Disease] : venous disease [Nutrition] : nutrition [How and When to Call] : how and when to call [Pain Management] : pain management [Compression Therapy] : compression therapy [Patient responsibility to plan of care] : patient responsibility to plan of care [Stable] : stable [Home] : Home [Ambulatory] : Ambulatory [Not Applicable - Long Term Care/Home Health Agency] : Long Term Care/Home Health Agency: Not Applicable [] : No [FreeTextEntry2] : Infection Prevention Wound care and Dressing changes Promote and Restore optimal skin integrity Nutrition and Wound Healing  Offloading and Pressure relief Protect and Guard wound site  Maintain acceptable levels of Pain Compliance R/T Compression therapy Compliance R/T Elevation of Lower Extremities [FreeTextEntry4] : F/U 1 week PO doxycycline completed cultures reviewed and discussed results, wound improvement noted

## 2025-05-08 NOTE — PHYSICAL EXAM
[4 x 4] : 4 x 4  [Normal Thyroid] : the thyroid was normal [Normal Breath Sounds] : Normal breath sounds [Normal Heart Sounds] : normal heart sounds [Normal Rate and Rhythm] : normal rate and rhythm [Alert] : alert [Oriented to Person] : oriented to person [Oriented to Place] : oriented to place [Oriented to Time] : oriented to time [Calm] : calm [JVD] : no jugular venous distention  [Abdomen Masses] : No abdominal massess [Abdomen Tenderness] : ~T ~M No abdominal tenderness [Tender] : nontender [Enlarged] : not enlarged [de-identified] : elderly WM, NAD, alert, Ox3. [FreeTextEntry1] : Left Lateral Lower Leg  [FreeTextEntry2] : 7.2 [FreeTextEntry3] : 3.0 [FreeTextEntry4] : 0.2 [de-identified] : Serosanguinous [de-identified] : intact [de-identified] : hypergranular with adherent scab in center [de-identified] : Circulation and Neuromuscular assessment done post application. [de-identified] :  Alginate Ag [de-identified] : Mechanically cleansed with 0.9% Normal Saline  Cloth Tape  MD Applied silver nitrate to hypergranular tissue  Personal Compression Stocking  [TWNoteComboBox3] : FT [TWNoteComboBox4] : Moderate [TWNoteComboBox5] : No [TWNoteComboBox6] : Traumatic [de-identified] : No [de-identified] : other [de-identified] : None [de-identified] : None [de-identified] : 100% [de-identified] : No [de-identified] : Daily [de-identified] : Primary Dressing

## 2025-05-08 NOTE — PLAN
[FreeTextEntry1] : left lateral lower leg wound AgAlginate,DD qod, compression AgNO3 1 stick to hypertrophic granulation  time spent 25mins.

## 2025-05-08 NOTE — VITALS
[Pain related to present condition?] : The patient's  pain is not related to present condition. [] : No [de-identified] : 0/10

## 2025-05-08 NOTE — HISTORY OF PRESENT ILLNESS
[FreeTextEntry1] : 73 yo WM, here for f/u of chronic LLE traumatic wound. Has been using collagenase and more granulation tissue now seen. No SOI. Recent bx was neg by dermatologist as per pt. Pt met with vascular surg. Dr. Kelley who recommended leg elevation/compression. Placed on po doxycycline recently. Discussed importance of leg elevation/compression. Pt states he is wearing his compr. stockings daily. Pt states he has trouble keeping his leg elevated because of pain in the wound. Presently using a diuretic.  Presently using collagenase and recent culture revealed Serratia m. No susceptibility results on doxycycline. The wound is improving and may be ready soon for connor again.  3/19/25 wound has improved. Increased granulation and decreased fibrotic tissue. No SOI. will consider re biopsy if wound not improving 3/27/25 left lateral lower leg wound  and smaller. No SOI Still unable to return to work. no available place to elevate lower legs 4/10/25 1 week S/P debridement left lateral lower leg. Path report inflammatory necrotic tissue and epithelium. No SOI Central wound hypertrophic granulation treated with AgNO3  1 stick 4/17/25 left lateral lower leg appears smaller and . 1 stick AgNO3 to hypertrophic granulation. No SOI 4/24/25 left lateral lower leg wound about the same size. Hypertrophic granulation addressed with 1 stick of AgNO3. Drainage brownish and periwound with slight increase in warmth. Does not look infected bur wound cultured and doxycycline started. 5/1/25 left lateral lower leg wound smaller. Less hypertrophic granulation. 1 stick of AgNO3 used. Less drainage. No SOI 5/8/25 left lateral lower leg wound smaller. Hypertrophic granulation cauterized with 1 stick AgNO3. No SOI

## 2025-05-08 NOTE — REVIEW OF SYSTEMS
[Skin Wound] : skin wound [Negative] : Psychiatric [FreeTextEntry5] : hypertension, hyperlipidemia, mitral valve repair [FreeTextEntry8] : BPH [de-identified] : wound left lateral lower leg

## 2025-05-08 NOTE — ASSESSMENT
[Verbal] : Verbal [Demo] : Demo [Patient] : Patient [Good - alert, interested, motivated] : Good - alert, interested, motivated [Verbalizes knowledge/Understanding] : Verbalizes knowledge/understanding [Dressing changes] : dressing changes [Skin Care] : skin care [Signs and symptoms of infection] : sign and symptoms of infection [Venous Disease] : venous disease [Nutrition] : nutrition [How and When to Call] : how and when to call [Pain Management] : pain management [Compression Therapy] : compression therapy [Patient responsibility to plan of care] : patient responsibility to plan of care [] : Yes [Stable] : stable [Home] : Home [Ambulatory] : Ambulatory [Not Applicable - Long Term Care/Home Health Agency] : Long Term Care/Home Health Agency: Not Applicable [FreeTextEntry2] : Infection Prevention Wound care and Dressing changes Promote and Restore optimal skin integrity Nutrition and Wound Healing  Offloading and Pressure relief Protect and Guard wound site  Maintain acceptable levels of Pain Compliance R/T Compression therapy Compliance R/T Elevation of Lower Extremities [FreeTextEntry4] : F/U 1 week Patient performs dressing change daily due to amount of drainage. Reinforced importance of elevation, compression stocking use.

## 2025-05-14 NOTE — REVIEW OF SYSTEMS
[Hand Pain] : hand pain [Negative] : Heme/Lymph [Dizziness] : dizziness [Lower Ext Edema] : lower extremity edema [FreeTextEntry5] : See HPI  [de-identified] : Vertigo

## 2025-05-14 NOTE — PHYSICAL EXAM
[General Appearance - Well Developed] : well developed [Normal Appearance] : normal appearance [Well Groomed] : well groomed [General Appearance - Well Nourished] : well nourished [No Deformities] : no deformities [General Appearance - In No Acute Distress] : no acute distress [Normal Oral Mucosa] : normal oral mucosa [Normal Jugular Venous A Waves Present] : normal jugular venous A waves present [Normal Jugular Venous V Waves Present] : normal jugular venous V waves present [No Jugular Venous Ortiz A Waves] : no jugular venous ortiz A waves [Respiration, Rhythm And Depth] : normal respiratory rhythm and effort [Exaggerated Use Of Accessory Muscles For Inspiration] : no accessory muscle use [Auscultation Breath Sounds / Voice Sounds] : lungs were clear to auscultation bilaterally [Bowel Sounds] : normal bowel sounds [Abdomen Soft] : soft [Abdomen Tenderness] : non-tender [Abnormal Walk] : normal gait [Gait - Sufficient For Exercise Testing] : the gait was sufficient for exercise testing [Nail Clubbing] : no clubbing of the fingernails [Cyanosis, Localized] : no localized cyanosis [Skin Color & Pigmentation] : normal skin color and pigmentation [Skin Turgor] : normal skin turgor [] : no rash [Oriented To Time, Place, And Person] : oriented to person, place, and time [Impaired Insight] : insight and judgment were intact [No Anxiety] : not feeling anxious [Normal Rate] : normal [Normal S1] : normal S1 [Normal S2] : normal S2 [No Murmur] : no murmurs heard [2+] : left 2+ [No Abnormalities] : the abdominal aorta was not enlarged and no bruit was heard [___ +] : bilateral [unfilled]U+ pitting edema to the ankles [Well Developed] : well developed [Well Nourished] : well nourished [No Acute Distress] : no acute distress [Normal Conjunctiva] : normal conjunctiva [Normal Venous Pressure] : normal venous pressure [No Carotid Bruit] : no carotid bruit [Normal S1, S2] : normal S1, S2 [No Rub] : no rub [No Gallop] : no gallop [Clear Lung Fields] : clear lung fields [Good Air Entry] : good air entry [No Respiratory Distress] : no respiratory distress  [Soft] : abdomen soft [Non Tender] : non-tender [No Masses/organomegaly] : no masses/organomegaly [Normal Bowel Sounds] : normal bowel sounds [Normal Gait] : normal gait [No Cyanosis] : no cyanosis [No Clubbing] : no clubbing [No Varicosities] : no varicosities [No Rash] : no rash [No Skin Lesions] : no skin lesions [Moves all extremities] : moves all extremities [No Focal Deficits] : no focal deficits [Normal Speech] : normal speech [Alert and Oriented] : alert and oriented [Normal memory] : normal memory [Edema ___] : edema [unfilled] [S3] : no S3 [S4] : no S4 [Right Carotid Bruit] : no bruit heard over the right carotid [Left Carotid Bruit] : no bruit heard over the left carotid [Right Femoral Bruit] : no bruit heard over the right femoral artery [Left Femoral Bruit] : no bruit heard over the left femoral artery [de-identified] : B/L trace edema

## 2025-05-14 NOTE — DISCUSSION/SUMMARY
[EKG obtained to assist in diagnosis and management of assessed problem(s)] : EKG obtained to assist in diagnosis and management of assessed problem(s) [FreeTextEntry1] : 72-year-old Man with a history of HTN, HLD, slow healing LLE traumatic wound, severe MR status post mitral valve repair with ring in 2002. At that time, he had normal coronary arteries.  He denies chest pain, SOB, MAIER.  He has intermittent ankle swelling.  His EKG unchanged. He should continue a daily baby aspirin.  He should be better about taking it daily.  He does not want to take Crestor.   His BP is elevated, just took his afternoon dose upon arrival to this appointment.  He does have blood pressure spikes related to when he takes his medications.   He has been advised to take both medications in the morning.  He will continue Nebivolol 10mg QD and Nifedipine 60mg QD in the morning for better blood pressure control.  He is taking Furosemide 20mg 3x week or as needed for mild ankle edema.  We discussed importance of healthy eating and daily exercise to reduce the risk of future cardiac events.    He will check his blood pressure at home and if > , to please call the office.    UPDATE: Pt called back with home blood pressure reading at 129/65 and HR 67bpm.  Dr Espino notified, recommend continue BP regime. Please make a F/U appointment at your convenience.

## 2025-05-14 NOTE — HISTORY OF PRESENT ILLNESS
[FreeTextEntry1] : Mr. Cirilo Swann presents to the office today for a blood pressure check and cardiac follow up.      He is a 72-year-old Man with a history of HTN, HLD, slow healing LLE traumatic wound, severe MR status post mitral valve repair with ring in 2002. At that time, he had normal coronary arteries.  He denies chest pain, SOB, MAIER.  He has intermittent ankle swelling.   He is physically active and has no chest pain. Occasionally he has mild shortness of breath. He does not do exercise but keep himself physically active.  The patient had a carotid Doppler performed yearly since valve repair which demonstrates mild plaque. He continues to take his ASA 3 x a week despite education on importance of taking daily.  He is also not taking his Crestor daily as he does not like how he feels.    Most recent echocardiography done in the hospital showed normal LV function with no significant valvular disease.  Nebivolol was increased to 10mg QD for better blood pressure control.  He at times gets spikes in his BP, mostly related to timing of his medications.    He presents today as a walk-in for blood pressure check and follow-up visit.  He comes in complaining of dizziness on Monday and this morning.  He takes his Nebivolol dosing in the morning and takes Nifedipine in the afternoon around 3pm.  His blood pressure was elevated and Nifedipine was just taken in the office.  Dizziness has subsided from this morning.  He is averse to adding more medications to his regimen. He is dealing with a Lt LE wound that he got from leg trauma when getting out of his car. B/L LE trace edema noted.  He takes Furosemide 20mg 3x week.  Stressed the importance of taking it every day.  Does not like running to the bathroom all day.  He denies any chest pain, chest pressure, PND, orthopnea, near syncope, syncope, or stroke-like symptoms.  Medication reconciliation was performed.  He is compliant with his medications.

## 2025-05-14 NOTE — REASON FOR VISIT
[Hyperlipidemia] : hyperlipidemia [Follow-Up - Clinic] : a clinic follow-up of [Hypertension] : hypertension [FreeTextEntry1] : Mitral valve repair, VPCs

## 2025-05-14 NOTE — PHYSICAL EXAM
[General Appearance - Well Developed] : well developed [Normal Appearance] : normal appearance [Well Groomed] : well groomed [General Appearance - Well Nourished] : well nourished [No Deformities] : no deformities [General Appearance - In No Acute Distress] : no acute distress [Normal Oral Mucosa] : normal oral mucosa [Normal Jugular Venous A Waves Present] : normal jugular venous A waves present [Normal Jugular Venous V Waves Present] : normal jugular venous V waves present [No Jugular Venous Ortiz A Waves] : no jugular venous ortiz A waves [Respiration, Rhythm And Depth] : normal respiratory rhythm and effort [Exaggerated Use Of Accessory Muscles For Inspiration] : no accessory muscle use [Auscultation Breath Sounds / Voice Sounds] : lungs were clear to auscultation bilaterally [Bowel Sounds] : normal bowel sounds [Abdomen Soft] : soft [Abdomen Tenderness] : non-tender [Abnormal Walk] : normal gait [Gait - Sufficient For Exercise Testing] : the gait was sufficient for exercise testing [Nail Clubbing] : no clubbing of the fingernails [Cyanosis, Localized] : no localized cyanosis [Skin Color & Pigmentation] : normal skin color and pigmentation [Skin Turgor] : normal skin turgor [] : no rash [Oriented To Time, Place, And Person] : oriented to person, place, and time [Impaired Insight] : insight and judgment were intact [No Anxiety] : not feeling anxious [Normal Rate] : normal [Normal S1] : normal S1 [Normal S2] : normal S2 [No Murmur] : no murmurs heard [2+] : left 2+ [No Abnormalities] : the abdominal aorta was not enlarged and no bruit was heard [___ +] : bilateral [unfilled]U+ pitting edema to the ankles [Well Developed] : well developed [Well Nourished] : well nourished [No Acute Distress] : no acute distress [Normal Conjunctiva] : normal conjunctiva [Normal Venous Pressure] : normal venous pressure [No Carotid Bruit] : no carotid bruit [Normal S1, S2] : normal S1, S2 [No Rub] : no rub [No Gallop] : no gallop [Clear Lung Fields] : clear lung fields [Good Air Entry] : good air entry [No Respiratory Distress] : no respiratory distress  [Soft] : abdomen soft [Non Tender] : non-tender [No Masses/organomegaly] : no masses/organomegaly [Normal Bowel Sounds] : normal bowel sounds [Normal Gait] : normal gait [No Cyanosis] : no cyanosis [No Clubbing] : no clubbing [No Varicosities] : no varicosities [No Rash] : no rash [No Skin Lesions] : no skin lesions [Moves all extremities] : moves all extremities [No Focal Deficits] : no focal deficits [Normal Speech] : normal speech [Alert and Oriented] : alert and oriented [Normal memory] : normal memory [Edema ___] : edema [unfilled] [S3] : no S3 [S4] : no S4 [Right Carotid Bruit] : no bruit heard over the right carotid [Left Carotid Bruit] : no bruit heard over the left carotid [Right Femoral Bruit] : no bruit heard over the right femoral artery [Left Femoral Bruit] : no bruit heard over the left femoral artery [de-identified] : B/L trace edema

## 2025-05-14 NOTE — REVIEW OF SYSTEMS
[Hand Pain] : hand pain [Negative] : Heme/Lymph [Dizziness] : dizziness [Lower Ext Edema] : lower extremity edema [FreeTextEntry5] : See HPI  [de-identified] : Vertigo

## 2025-05-14 NOTE — CARDIOLOGY SUMMARY
[Normal] : normal [___] : [unfilled] [LVEF ___%] : LVEF [unfilled]% [de-identified] : 5/14/25: Sinus bradycardia  4/1/25: Sinus bradycardia.  [de-identified] : 4/29/25: Normal RV/LV size & function with EF 56%. LA severely dilated. Annuloplasty ring noted.  Moderate MS with PG 17.8 & MG 8. Trace AI. 5/2022 - Mitral Valve annuloplasty ring seen. minimal mitral regurgitation. EF 55 - 60% [de-identified] : 5/7/25: Carotid Doppler - mild atherosclerosis 5/2022 - Carotid Doppler - mild plaque to Left and right carotid 8/2019 Carotid Doppler - mild plaque

## 2025-05-14 NOTE — CARDIOLOGY SUMMARY
[Normal] : normal [___] : [unfilled] [LVEF ___%] : LVEF [unfilled]% [de-identified] : 5/14/25: Sinus bradycardia  4/1/25: Sinus bradycardia.  [de-identified] : 4/29/25: Normal RV/LV size & function with EF 56%. LA severely dilated. Annuloplasty ring noted.  Moderate MS with PG 17.8 & MG 8. Trace AI. 5/2022 - Mitral Valve annuloplasty ring seen. minimal mitral regurgitation. EF 55 - 60% [de-identified] : 5/7/25: Carotid Doppler - mild atherosclerosis 5/2022 - Carotid Doppler - mild plaque to Left and right carotid 8/2019 Carotid Doppler - mild plaque

## 2025-05-15 NOTE — REVIEW OF SYSTEMS
[Skin Wound] : skin wound [Negative] : Psychiatric [FreeTextEntry5] : hypertension, hyperlipidemia, mitral valve repair [FreeTextEntry8] : BPH [de-identified] : wound left lateral lower leg

## 2025-05-15 NOTE — PHYSICAL EXAM
[4 x 4] : 4 x 4  [JVD] : no jugular venous distention  [Normal Thyroid] : the thyroid was normal [Normal Breath Sounds] : Normal breath sounds [Normal Heart Sounds] : normal heart sounds [Normal Rate and Rhythm] : normal rate and rhythm [Abdomen Masses] : No abdominal massess [Abdomen Tenderness] : ~T ~M No abdominal tenderness [Tender] : nontender [Enlarged] : not enlarged [Alert] : alert [Oriented to Person] : oriented to person [Oriented to Place] : oriented to place [Oriented to Time] : oriented to time [Calm] : calm [de-identified] : elderly WM, NAD, alert, Ox3. [FreeTextEntry1] : Left Lateral Lower Leg  [FreeTextEntry2] : 7.1 [FreeTextEntry3] : 3.0 [FreeTextEntry4] : Hypergranular [de-identified] : Serosanguinous [de-identified] : intact [de-identified] : adherent scab in center of wound  [de-identified] : Circulation and Neuromuscular assessment done post application. [de-identified] :  Alginate Ag [de-identified] : Mechanically cleansed with 0.9% Normal Saline  Cloth Tape  MD Meek cauterized hyper-granular tissue with silver nitrate stick x1.  Personal Compression Stocking  [TWNoteComboBox3] : FT [TWNoteComboBox4] : Moderate [TWNoteComboBox5] : No [TWNoteComboBox6] : Traumatic [de-identified] : No [de-identified] : other [de-identified] : None [de-identified] : None [de-identified] : 100% [de-identified] : No [de-identified] : Every other day [de-identified] : Primary Dressing

## 2025-05-15 NOTE — ASSESSMENT
[Verbal] : Verbal [Demo] : Demo [Patient] : Patient [Good - alert, interested, motivated] : Good - alert, interested, motivated [Verbalizes knowledge/Understanding] : Verbalizes knowledge/understanding [Dressing changes] : dressing changes [Skin Care] : skin care [Signs and symptoms of infection] : sign and symptoms of infection [Venous Disease] : venous disease [Nutrition] : nutrition [How and When to Call] : how and when to call [Pain Management] : pain management [Compression Therapy] : compression therapy [Patient responsibility to plan of care] : patient responsibility to plan of care [] : Yes [Stable] : stable [Home] : Home [Ambulatory] : Ambulatory [Not Applicable - Long Term Care/Home Health Agency] : Long Term Care/Home Health Agency: Not Applicable [FreeTextEntry2] : Infection Prevention Wound care and Dressing changes Promote and Restore optimal skin integrity Nutrition and Wound Healing  Offloading and Pressure relief Protect and Guard wound site  Maintain acceptable levels of Pain Compliance R/T Compression therapy Compliance R/T Elevation of Lower Extremities [FreeTextEntry4] : MD assessed wound site  Patient verbalized understanding of all discussed. Patient performs dressing change daily, prn, depending on amount of drainage. Reinforced importance of elevation, compression stocking use. Patient to return to [facility] in One Week.

## 2025-05-22 NOTE — HISTORY OF PRESENT ILLNESS
[FreeTextEntry1] : 71 yo WM, here for f/u of chronic LLE traumatic wound. Has been using collagenase and more granulation tissue now seen. No SOI. Recent bx was neg by dermatologist as per pt. Pt met with vascular surg. Dr. Kelley who recommended leg elevation/compression. Placed on po doxycycline recently. Discussed importance of leg elevation/compression. Pt states he is wearing his compr. stockings daily. Pt states he has trouble keeping his leg elevated because of pain in the wound. Presently using a diuretic.  Presently using collagenase and recent culture revealed Serratia m. No susceptibility results on doxycycline. The wound is improving and may be ready soon for connor again.  3/19/25 wound has improved. Increased granulation and decreased fibrotic tissue. No SOI. will consider re biopsy if wound not improving 3/27/25 left lateral lower leg wound  and smaller. No SOI Still unable to return to work. no available place to elevate lower legs 4/10/25 1 week S/P debridement left lateral lower leg. Path report inflammatory necrotic tissue and epithelium. No SOI Central wound hypertrophic granulation treated with AgNO3  1 stick 4/17/25 left lateral lower leg appears smaller and . 1 stick AgNO3 to hypertrophic granulation. No SOI 4/24/25 left lateral lower leg wound about the same size. Hypertrophic granulation addressed with 1 stick of AgNO3. Drainage brownish and periwound with slight increase in warmth. Does not look infected bur wound cultured and doxycycline started. 5/1/25 left lateral lower leg wound smaller. Less hypertrophic granulation. 1 stick of AgNO3 used. Less drainage. No SOI 5/8/25 left lateral lower leg wound smaller. Hypertrophic granulation cauterized with 1 stick AgNO3. No SOI 5/15/25 left lower leg wound smaller with what appears to be a small skin bridge with a scab on distal end. Hypertrophic granulation cauterized with 1 stick of AgNO3. No SOI 5/22/25 left lower leg  wound smaller with increased area of epithelialization small amount of hypertrophic granulation cauterized with 1 stick on AgNO3. No SOI

## 2025-05-22 NOTE — ASSESSMENT
[Verbal] : Verbal [Demo] : Demo [Patient] : Patient [Good - alert, interested, motivated] : Good - alert, interested, motivated [Verbalizes knowledge/Understanding] : Verbalizes knowledge/understanding [Dressing changes] : dressing changes [Skin Care] : skin care [Signs and symptoms of infection] : sign and symptoms of infection [Venous Disease] : venous disease [Nutrition] : nutrition [How and When to Call] : how and when to call [Pain Management] : pain management [Compression Therapy] : compression therapy [Patient responsibility to plan of care] : patient responsibility to plan of care [Stable] : stable [Home] : Home [Ambulatory] : Ambulatory [Not Applicable - Long Term Care/Home Health Agency] : Long Term Care/Home Health Agency: Not Applicable [] : No [FreeTextEntry2] : Infection Prevention Wound care and Dressing changes Promote and Restore optimal skin integrity Nutrition and Wound Healing  Offloading and Pressure relief Protect and Guard wound site  Maintain acceptable levels of Pain Compliance R/T Compression therapy Compliance R/T Elevation of Lower Extremities [FreeTextEntry4] : MD assessed wound site  Patient verbalized understanding of all discussed. Reinforced importance of elevation, compression stocking use. Patient to return to [facility] in One Week.

## 2025-05-22 NOTE — PHYSICAL EXAM
[4 x 4] : 4 x 4  [JVD] : no jugular venous distention  [Normal Thyroid] : the thyroid was normal [Normal Breath Sounds] : Normal breath sounds [Normal Heart Sounds] : normal heart sounds [Normal Rate and Rhythm] : normal rate and rhythm [Abdomen Masses] : No abdominal massess [Abdomen Tenderness] : ~T ~M No abdominal tenderness [Tender] : nontender [Enlarged] : not enlarged [Alert] : alert [Oriented to Person] : oriented to person [Oriented to Place] : oriented to place [Oriented to Time] : oriented to time [Calm] : calm [de-identified] : elderly WM, NAD, alert, Ox3. [FreeTextEntry1] : Left Lateral Lower Leg  [FreeTextEntry2] : 7.0 [FreeTextEntry3] : 2.5 [FreeTextEntry4] : Hypergranular [de-identified] : Serosanguinous [de-identified] : intact [de-identified] : adherent scab in center of wound  [de-identified] : Circulation and Neuromuscular assessment done post application. [de-identified] :  Alginate Ag [de-identified] : Mechanically cleansed with 0.9% Normal Saline  Cloth Tape  MD Meek cauterized hyper-granular tissue with silver nitrate stick x1.  Personal Compression Stocking  [TWNoteComboBox3] : FT [TWNoteComboBox4] : Moderate [TWNoteComboBox5] : No [TWNoteComboBox6] : Traumatic [de-identified] : No [de-identified] : other [de-identified] : None [de-identified] : None [de-identified] : 100% [de-identified] : No [de-identified] : Every other day [de-identified] : Primary Dressing

## 2025-05-22 NOTE — REVIEW OF SYSTEMS
[Skin Wound] : skin wound [Negative] : Psychiatric [FreeTextEntry5] : hypertension, hyperlipidemia, mitral valve repair [FreeTextEntry8] : BPH [de-identified] : wound left lateral lower leg

## 2025-05-29 NOTE — HISTORY OF PRESENT ILLNESS
[FreeTextEntry1] : 73 yo WM, here for f/u of chronic LLE traumatic wound. Has been using collagenase and more granulation tissue now seen. No SOI. Recent bx was neg by dermatologist as per pt. Pt met with vascular surg. Dr. Kelley who recommended leg elevation/compression. Placed on po doxycycline recently. Discussed importance of leg elevation/compression. Pt states he is wearing his compr. stockings daily. Pt states he has trouble keeping his leg elevated because of pain in the wound. Presently using a diuretic.  Presently using collagenase and recent culture revealed Serratia m. No susceptibility results on doxycycline. The wound is improving and may be ready soon for cnonor again.  3/19/25 wound has improved. Increased granulation and decreased fibrotic tissue. No SOI. will consider re biopsy if wound not improving 3/27/25 left lateral lower leg wound  and smaller. No SOI Still unable to return to work. no available place to elevate lower legs 4/10/25 1 week S/P debridement left lateral lower leg. Path report inflammatory necrotic tissue and epithelium. No SOI Central wound hypertrophic granulation treated with AgNO3  1 stick 4/17/25 left lateral lower leg appears smaller and . 1 stick AgNO3 to hypertrophic granulation. No SOI 4/24/25 left lateral lower leg wound about the same size. Hypertrophic granulation addressed with 1 stick of AgNO3. Drainage brownish and periwound with slight increase in warmth. Does not look infected bur wound cultured and doxycycline started. 5/1/25 left lateral lower leg wound smaller. Less hypertrophic granulation. 1 stick of AgNO3 used. Less drainage. No SOI 5/8/25 left lateral lower leg wound smaller. Hypertrophic granulation cauterized with 1 stick AgNO3. No SOI 5/15/25 left lower leg wound smaller with what appears to be a small skin bridge with a scab on distal end. Hypertrophic granulation cauterized with 1 stick of AgNO3. No SOI 5/22/25 left lower leg  wound smaller with increased area of epithelialization small amount of hypertrophic granulation cauterized with 1 stick on AgNO3. No SOI 5/29/25 left lateral lower leg wound much smaller and . Less hypertrophic granulation noted. ! stick of AgNO3 to granulation

## 2025-05-29 NOTE — ASSESSMENT
[Verbal] : Verbal [Demo] : Demo [Patient] : Patient [Good - alert, interested, motivated] : Good - alert, interested, motivated [Verbalizes knowledge/Understanding] : Verbalizes knowledge/understanding [Dressing changes] : dressing changes [Skin Care] : skin care [Signs and symptoms of infection] : sign and symptoms of infection [Venous Disease] : venous disease [Nutrition] : nutrition [How and When to Call] : how and when to call [Pain Management] : pain management [Compression Therapy] : compression therapy [Patient responsibility to plan of care] : patient responsibility to plan of care [Stable] : stable [Home] : Home [Ambulatory] : Ambulatory [Not Applicable - Long Term Care/Home Health Agency] : Long Term Care/Home Health Agency: Not Applicable [] : Yes [FreeTextEntry2] : Infection Prevention Wound care and Dressing changes Promote and Restore optimal skin integrity Nutrition and Wound Healing  Offloading and Pressure relief Protect and Guard wound site  Maintain acceptable levels of Pain Compliance R/T Compression therapy Compliance R/T Elevation of Lower Extremities [FreeTextEntry3] : smaller [FreeTextEntry4] : MD assessed wound site Patient performs own wound care Patient verbalized understanding of all discussed. Reinforced importance of elevation, compression stocking use. Patient to return to [facility] in One Week.

## 2025-05-29 NOTE — PHYSICAL EXAM
[4 x 4] : 4 x 4  [JVD] : no jugular venous distention  [Normal Thyroid] : the thyroid was normal [Normal Breath Sounds] : Normal breath sounds [Normal Heart Sounds] : normal heart sounds [Normal Rate and Rhythm] : normal rate and rhythm [Abdomen Masses] : No abdominal massess [Abdomen Tenderness] : ~T ~M No abdominal tenderness [Tender] : nontender [Enlarged] : not enlarged [Alert] : alert [Oriented to Person] : oriented to person [Oriented to Place] : oriented to place [Oriented to Time] : oriented to time [Calm] : calm [de-identified] : elderly WM, NAD, alert, Ox3. [FreeTextEntry1] : Left Lateral Lower Leg  [FreeTextEntry2] : 6.5 [FreeTextEntry3] : 2.5 [FreeTextEntry4] : Hypergranular [de-identified] : Serosanguinous [de-identified] : intact [de-identified] : adherent scab in center of wound removed [de-identified] : Circulation and Neuromuscular assessment done post application. [de-identified] :  Alginate Ag [de-identified] : Mechanically cleansed with 0.9% Normal Saline  Cloth Tape  MD Meek cauterized hyper-granular tissue with silver nitrate stick x1.  Personal Compression Stocking  [TWNoteComboBox3] : FT [TWNoteComboBox4] : Moderate [TWNoteComboBox5] : No [TWNoteComboBox6] : Traumatic [de-identified] : No [de-identified] : other [de-identified] : None [de-identified] : None [de-identified] : 100% [de-identified] : No [TWNoteComboBox7] : Mechanical [de-identified] : Every other day [de-identified] : Primary Dressing

## 2025-05-29 NOTE — REVIEW OF SYSTEMS
[Skin Wound] : skin wound [Negative] : Psychiatric [FreeTextEntry5] : hypertension, hyperlipidemia, mitral valve repair [FreeTextEntry8] : BPH [de-identified] : wound left lateral lower leg

## 2025-06-12 NOTE — ASSESSMENT
[Verbal] : Verbal [Demo] : Demo [Patient] : Patient [Good - alert, interested, motivated] : Good - alert, interested, motivated [Verbalizes knowledge/Understanding] : Verbalizes knowledge/understanding [Dressing changes] : dressing changes [Skin Care] : skin care [Signs and symptoms of infection] : sign and symptoms of infection [Venous Disease] : venous disease [Nutrition] : nutrition [How and When to Call] : how and when to call [Pain Management] : pain management [Compression Therapy] : compression therapy [Patient responsibility to plan of care] : patient responsibility to plan of care [] : Yes [Stable] : stable [Home] : Home [Ambulatory] : Ambulatory [Not Applicable - Long Term Care/Home Health Agency] : Long Term Care/Home Health Agency: Not Applicable [FreeTextEntry2] : Infection Prevention Wound care and Dressing changes Promote and Restore optimal skin integrity Nutrition and Wound Healing  Offloading and Pressure relief Protect and Guard wound site  Maintain acceptable levels of Pain Compliance R/T Compression therapy Compliance R/T Elevation of Lower Extremities [FreeTextEntry4] : Patient is able to perform his own dressing changes, no supplies needed. Patient verbalized understanding of all discussed. Reinforced importance of elevation, compression stocking use. Patient to return to [facility] in One Week.

## 2025-06-12 NOTE — PHYSICAL EXAM
[4 x 4] : 4 x 4  [JVD] : no jugular venous distention  [Normal Thyroid] : the thyroid was normal [Normal Breath Sounds] : Normal breath sounds [Normal Heart Sounds] : normal heart sounds [Normal Rate and Rhythm] : normal rate and rhythm [Abdomen Masses] : No abdominal massess [Abdomen Tenderness] : ~T ~M No abdominal tenderness [Tender] : nontender [Enlarged] : not enlarged [Alert] : alert [Oriented to Person] : oriented to person [Oriented to Place] : oriented to place [Oriented to Time] : oriented to time [Calm] : calm [de-identified] : elderly WM, NAD, alert, Ox3. [FreeTextEntry1] : Left Lateral Lower Leg  [FreeTextEntry2] : 5.8 [FreeTextEntry3] : 1.5 [FreeTextEntry4] : Hypergranular [de-identified] : Serosanguinous [de-identified] : intact [de-identified] : epithelial bridge in the middle of the wound [de-identified] : Circulation and Neuromuscular assessment done post application. [de-identified] :  Alginate Ag [de-identified] : Mechanically cleansed with 0.9% Normal Saline  Cloth Tape Rae TRIPATHI used Silver Nitrate Stick for hyper-granulation tissue.    Personal Compression Stocking  [TWNoteComboBox4] : Moderate [TWNoteComboBox5] : No [de-identified] : No [TWNoteComboBox6] : Traumatic [de-identified] : other [de-identified] : None [de-identified] : None [de-identified] : No [de-identified] : 100% [de-identified] : Other [de-identified] : Every other day [de-identified] : Primary Dressing

## 2025-06-12 NOTE — PLAN
[FreeTextEntry1] : left lateral lower leg wound AgAlginate,DD qod, compression AgNO3 1 stick to hypertrophic granulation  return 1 week time spent 25mins.

## 2025-06-12 NOTE — VITALS
[Pain related to present condition?] : The patient's  pain is not related to present condition. [] : No [de-identified] : 0/10

## 2025-06-12 NOTE — REVIEW OF SYSTEMS
[Skin Wound] : skin wound [Negative] : Psychiatric [FreeTextEntry5] : hypertension, hyperlipidemia, mitral valve repair [FreeTextEntry8] : BPH [de-identified] : wound left lateral lower leg

## 2025-06-12 NOTE — HISTORY OF PRESENT ILLNESS
[FreeTextEntry1] : 73 yo WM, here for f/u of chronic LLE traumatic wound. Has been using collagenase and more granulation tissue now seen. No SOI. Recent bx was neg by dermatologist as per pt. Pt met with vascular surg. Dr. Kelley who recommended leg elevation/compression. Placed on po doxycycline recently. Discussed importance of leg elevation/compression. Pt states he is wearing his compr. stockings daily. Pt states he has trouble keeping his leg elevated because of pain in the wound. Presently using a diuretic.  Presently using collagenase and recent culture revealed Serratia m. No susceptibility results on doxycycline. The wound is improving and may be ready soon for connor again.  3/19/25 wound has improved. Increased granulation and decreased fibrotic tissue. No SOI. will consider re biopsy if wound not improving 3/27/25 left lateral lower leg wound  and smaller. No SOI Still unable to return to work. no available place to elevate lower legs 4/10/25 1 week S/P debridement left lateral lower leg. Path report inflammatory necrotic tissue and epithelium. No SOI Central wound hypertrophic granulation treated with AgNO3  1 stick 4/17/25 left lateral lower leg appears smaller and . 1 stick AgNO3 to hypertrophic granulation. No SOI 4/24/25 left lateral lower leg wound about the same size. Hypertrophic granulation addressed with 1 stick of AgNO3. Drainage brownish and periwound with slight increase in warmth. Does not look infected bur wound cultured and doxycycline started. 5/1/25 left lateral lower leg wound smaller. Less hypertrophic granulation. 1 stick of AgNO3 used. Less drainage. No SOI 5/8/25 left lateral lower leg wound smaller. Hypertrophic granulation cauterized with 1 stick AgNO3. No SOI 5/15/25 left lower leg wound smaller with what appears to be a small skin bridge with a scab on distal end. Hypertrophic granulation cauterized with 1 stick of AgNO3. No SOI 5/22/25 left lower leg  wound smaller with increased area of epithelialization small amount of hypertrophic granulation cauterized with 1 stick on AgNO3. No SOI 5/29/25 left lateral lower leg wound much smaller and . Less hypertrophic granulation noted. ! stick of AgNO3 to granulation 6/5/25 left lower leg wound smaller. Some hypertrophic granulation. 1 stick AgNO3 used. no SOI 6/12/25 left lower leg wound smaller with increased epithelium as well.. Hypertrophic granulation cauterized with AgNO3 1 stick. No SOI

## 2025-06-19 NOTE — HISTORY OF PRESENT ILLNESS
[FreeTextEntry1] : 73 yo WM, here for f/u of chronic LLE traumatic wound. Has been using collagenase and more granulation tissue now seen. No SOI. Recent bx was neg by dermatologist as per pt. Pt met with vascular surg. Dr. Kelley who recommended leg elevation/compression. Placed on po doxycycline recently. Discussed importance of leg elevation/compression. Pt states he is wearing his compr. stockings daily. Pt states he has trouble keeping his leg elevated because of pain in the wound. Presently using a diuretic.  Presently using collagenase and recent culture revealed Serratia m. No susceptibility results on doxycycline. The wound is improving and may be ready soon for connor again.  3/19/25 wound has improved. Increased granulation and decreased fibrotic tissue. No SOI. will consider re biopsy if wound not improving 3/27/25 left lateral lower leg wound  and smaller. No SOI Still unable to return to work. no available place to elevate lower legs 4/10/25 1 week S/P debridement left lateral lower leg. Path report inflammatory necrotic tissue and epithelium. No SOI Central wound hypertrophic granulation treated with AgNO3  1 stick 4/17/25 left lateral lower leg appears smaller and . 1 stick AgNO3 to hypertrophic granulation. No SOI 4/24/25 left lateral lower leg wound about the same size. Hypertrophic granulation addressed with 1 stick of AgNO3. Drainage brownish and periwound with slight increase in warmth. Does not look infected bur wound cultured and doxycycline started. 5/1/25 left lateral lower leg wound smaller. Less hypertrophic granulation. 1 stick of AgNO3 used. Less drainage. No SOI 5/8/25 left lateral lower leg wound smaller. Hypertrophic granulation cauterized with 1 stick AgNO3. No SOI 5/15/25 left lower leg wound smaller with what appears to be a small skin bridge with a scab on distal end. Hypertrophic granulation cauterized with 1 stick of AgNO3. No SOI 5/22/25 left lower leg  wound smaller with increased area of epithelialization small amount of hypertrophic granulation cauterized with 1 stick on AgNO3. No SOI 5/29/25 left lateral lower leg wound much smaller and . Less hypertrophic granulation noted. ! stick of AgNO3 to granulation 6/5/25 left lower leg wound smaller. Some hypertrophic granulation. 1 stick AgNO3 used. no SOI 6/12/25 left lower leg wound smaller with increased epithelium as well.. Hypertrophic granulation cauterized with AgNO3 1 stick. No SOI 6/19/25 left lower lateral leg wound smaller. Minimal hyperthropic granulation cauterized with 1 stick of AgNO3. No SOI

## 2025-06-19 NOTE — PHYSICAL EXAM
[Normal Thyroid] : the thyroid was normal [Normal Breath Sounds] : Normal breath sounds [Normal Heart Sounds] : normal heart sounds [Normal Rate and Rhythm] : normal rate and rhythm [Alert] : alert [Oriented to Person] : oriented to person [Oriented to Place] : oriented to place [Oriented to Time] : oriented to time [Calm] : calm [JVD] : no jugular venous distention  [Abdomen Masses] : No abdominal massess [Abdomen Tenderness] : ~T ~M No abdominal tenderness [Tender] : nontender [Enlarged] : not enlarged [de-identified] : elderly WM, NAD, alert, Ox3. [4 x 4] : 4 x 4  [FreeTextEntry1] : Left lateral lower leg - 2 wounds  by large bridge of epithelial tissue  [FreeTextEntry2] : 5.8 [FreeTextEntry3] : 1.0 [FreeTextEntry4] : hypergranulation  [de-identified] : Serous/sanguinous  [de-identified] : Own compression stockings  [de-identified] : Silver alginate  [de-identified] : Mechanically cleansed with sterile gauze and normal saline. Cloth tape  Kerlix   AgNo3 used by MD [TWNoteComboBox4] : Small [TWNoteComboBox6] : Traumatic [de-identified] : Normal [de-identified] : None [de-identified] : 100% [de-identified] : None [de-identified] : No [de-identified] : Every other day [de-identified] : Primary Dressing

## 2025-06-19 NOTE — REVIEW OF SYSTEMS
[Skin Wound] : skin wound [Negative] : Psychiatric [FreeTextEntry5] : hypertension, hyperlipidemia, mitral valve repair [FreeTextEntry8] : BPH [de-identified] : wound left lateral lower leg

## 2025-06-19 NOTE — ASSESSMENT
[Verbal] : Verbal [Written] : Written [Demo] : Demo [Patient] : Patient [Good - alert, interested, motivated] : Good - alert, interested, motivated [Verbalizes knowledge/Understanding] : Verbalizes knowledge/understanding [Dressing changes] : dressing changes [Skin Care] : skin care [Signs and symptoms of infection] : sign and symptoms of infection [How and When to Call] : how and when to call [Pain Management] : pain management [Compression Therapy] : compression therapy [Patient responsibility to plan of care] : patient responsibility to plan of care [] : Yes [FreeTextEntry2] : Infection prevention Localized wound care  Goal remaining pain free regarding wounds  [FreeTextEntry4] : Patient has supplies at home and preforms his own dressing changes. Follow up in 1 week  [Stable] : stable [Home] : Home [Ambulatory] : Ambulatory [Not Applicable - Long Term Care/Home Health Agency] : Long Term Care/Home Health Agency: Not Applicable

## 2025-06-25 NOTE — REVIEW OF SYSTEMS
[Skin Wound] : skin wound [Negative] : Psychiatric [FreeTextEntry5] : hypertension, hyperlipidemia, mitral valve repair [FreeTextEntry8] : BPH [de-identified] : wound left lateral lower leg

## 2025-06-25 NOTE — HISTORY OF PRESENT ILLNESS
[FreeTextEntry1] : 73 yo WM, here for f/u of chronic LLE traumatic wound. Has been using collagenase and more granulation tissue now seen. No SOI. Recent bx was neg by dermatologist as per pt. Pt met with vascular surg. Dr. Kelley who recommended leg elevation/compression. Placed on po doxycycline recently. Discussed importance of leg elevation/compression. Pt states he is wearing his compr. stockings daily. Pt states he has trouble keeping his leg elevated because of pain in the wound. Presently using a diuretic.  Presently using collagenase and recent culture revealed Serratia m. No susceptibility results on doxycycline. The wound is improving and may be ready soon for connor again.  3/19/25 wound has improved. Increased granulation and decreased fibrotic tissue. No SOI. will consider re biopsy if wound not improving 3/27/25 left lateral lower leg wound  and smaller. No SOI Still unable to return to work. no available place to elevate lower legs 4/10/25 1 week S/P debridement left lateral lower leg. Path report inflammatory necrotic tissue and epithelium. No SOI Central wound hypertrophic granulation treated with AgNO3  1 stick 4/17/25 left lateral lower leg appears smaller and . 1 stick AgNO3 to hypertrophic granulation. No SOI 4/24/25 left lateral lower leg wound about the same size. Hypertrophic granulation addressed with 1 stick of AgNO3. Drainage brownish and periwound with slight increase in warmth. Does not look infected bur wound cultured and doxycycline started. 5/1/25 left lateral lower leg wound smaller. Less hypertrophic granulation. 1 stick of AgNO3 used. Less drainage. No SOI 5/8/25 left lateral lower leg wound smaller. Hypertrophic granulation cauterized with 1 stick AgNO3. No SOI 5/15/25 left lower leg wound smaller with what appears to be a small skin bridge with a scab on distal end. Hypertrophic granulation cauterized with 1 stick of AgNO3. No SOI 5/22/25 left lower leg  wound smaller with increased area of epithelialization small amount of hypertrophic granulation cauterized with 1 stick on AgNO3. No SOI 5/29/25 left lateral lower leg wound much smaller and . Less hypertrophic granulation noted. ! stick of AgNO3 to granulation 6/5/25 left lower leg wound smaller. Some hypertrophic granulation. 1 stick AgNO3 used. no SOI 6/12/25 left lower leg wound smaller with increased epithelium as well.. Hypertrophic granulation cauterized with AgNO3 1 stick. No SOI 6/19/25 left lower lateral leg wound smaller. Minimal hyperthropic granulation cauterized with 1 stick of AgNO3. No SOI 6/25/25 left lower lateral  leg wound almost closed with some scabbed over spots. No SOI

## 2025-06-25 NOTE — PLAN
[FreeTextEntry1] : left lateral lower leg wound adaptic,DD qod, compression   return 2 weeks time spent 25mins.

## 2025-06-25 NOTE — PHYSICAL EXAM
[4 x 4] : 4 x 4  [JVD] : no jugular venous distention  [Normal Thyroid] : the thyroid was normal [Normal Breath Sounds] : Normal breath sounds [Normal Heart Sounds] : normal heart sounds [Normal Rate and Rhythm] : normal rate and rhythm [Abdomen Masses] : No abdominal massess [Abdomen Tenderness] : ~T ~M No abdominal tenderness [Tender] : nontender [Enlarged] : not enlarged [Alert] : alert [Oriented to Person] : oriented to person [Oriented to Place] : oriented to place [Oriented to Time] : oriented to time [Calm] : calm [de-identified] : elderly WM, NAD, alert, Ox3. [FreeTextEntry1] : Left lateral lower leg - 2 wounds  by large bridge of epithelial tissue  [FreeTextEntry2] : 5.8 [FreeTextEntry3] : 1.5 [FreeTextEntry4] : scab  [de-identified] : Serous/sanguinous  [de-identified] : scabbed [de-identified] : Own compression stockings  [de-identified] : Adaptec  [de-identified] : Mechanically cleansed with sterile gauze and normal saline. Cloth tape  Kerlix    [TWNoteComboBox4] : None [TWNoteComboBox6] : Traumatic [de-identified] : Normal [de-identified] : None [de-identified] : None [de-identified] : <20% [de-identified] : No [de-identified] : Daily [de-identified] : Primary Dressing

## 2025-06-25 NOTE — ASSESSMENT
[Verbal] : Verbal [Written] : Written [Patient] : Patient [Good - alert, interested, motivated] : Good - alert, interested, motivated [Verbalizes knowledge/Understanding] : Verbalizes knowledge/understanding [Dressing changes] : dressing changes [Skin Care] : skin care [Signs and symptoms of infection] : sign and symptoms of infection [How and When to Call] : how and when to call [Pain Management] : pain management [Compression Therapy] : compression therapy [Patient responsibility to plan of care] : patient responsibility to plan of care [Stable] : stable [Home] : Home [Ambulatory] : Ambulatory [Not Applicable - Long Term Care/Home Health Agency] : Long Term Care/Home Health Agency: Not Applicable [] : No [FreeTextEntry2] : Infection prevention Localized wound care  Goal remaining pain free regarding wounds  [FreeTextEntry3] : scabbed over with 2 small openings [FreeTextEntry4] : Patient has supplies at home and preforms his own dressing changes. Patient refusing removal of scab at this time Patient given Adaptec Follow up in 2 week s

## 2025-07-03 NOTE — REVIEW OF SYSTEMS
[Skin Wound] : skin wound [Negative] : Psychiatric [FreeTextEntry5] : hypertension, hyperlipidemia, mitral valve repair [FreeTextEntry8] : BPH [de-identified] : wound left lateral lower leg

## 2025-07-03 NOTE — PLAN
[FreeTextEntry1] : left lateral lower leg wound adaptic,DD qod, compression   return 1 week time spent 25mins.

## 2025-07-03 NOTE — ASSESSMENT
[Verbal] : Verbal [Demo] : Demo [Patient] : Patient [Good - alert, interested, motivated] : Good - alert, interested, motivated [Verbalizes knowledge/Understanding] : Verbalizes knowledge/understanding [Dressing changes] : dressing changes [Skin Care] : skin care [Signs and symptoms of infection] : sign and symptoms of infection [How and When to Call] : how and when to call [Pain Management] : pain management [Compression Therapy] : compression therapy [Patient responsibility to plan of care] : patient responsibility to plan of care [Stable] : stable [Home] : Home [Ambulatory] : Ambulatory [Not Applicable - Long Term Care/Home Health Agency] : Long Term Care/Home Health Agency: Not Applicable [] : Yes [FreeTextEntry2] : Infection prevention Localized wound care  Goal remaining pain free regarding wounds  [FreeTextEntry4] : - MD assessed and advised to continue to trest as ordered. - Pt stated he will allow MD to attempt to remove scab next week. - Patient needs dry dressings - some given to ensure continuity of care. Has adaptic touch and preforms his own dressing changes. F/U up 2 weeks

## 2025-07-03 NOTE — PHYSICAL EXAM
[4 x 4] : 4 x 4  [JVD] : no jugular venous distention  [Normal Thyroid] : the thyroid was normal [Normal Breath Sounds] : Normal breath sounds [Normal Heart Sounds] : normal heart sounds [Normal Rate and Rhythm] : normal rate and rhythm [Abdomen Masses] : No abdominal massess [Abdomen Tenderness] : ~T ~M No abdominal tenderness [Tender] : nontender [Enlarged] : not enlarged [Alert] : alert [Oriented to Person] : oriented to person [Oriented to Place] : oriented to place [Oriented to Time] : oriented to time [Calm] : calm [de-identified] : elderly WM, NAD, alert, Ox3. [FreeTextEntry1] : Left Lateral Lower Leg - 2 wounds  by large bridge of epithelial tissue  [FreeTextEntry2] : 5.7 [FreeTextEntry3] : 1.5 [FreeTextEntry4] : scab  [de-identified] : 20%, 80% scab [de-identified] : Own compression stockings  [de-identified] : Adaptic Touch  [de-identified] : Mechanically cleansed with sterile gauze and normal saline. Cloth tape  Kerlix    [TWNoteComboBox4] : None [TWNoteComboBox5] : No [TWNoteComboBox6] : Traumatic [de-identified] : No [de-identified] : Normal [de-identified] : None [de-identified] : None [de-identified] : No [de-identified] : Daily [de-identified] : Primary Dressing

## 2025-07-03 NOTE — HISTORY OF PRESENT ILLNESS
[FreeTextEntry1] : 73 yo WM, here for f/u of chronic LLE traumatic wound. Has been using collagenase and more granulation tissue now seen. No SOI. Recent bx was neg by dermatologist as per pt. Pt met with vascular surg. Dr. Kelley who recommended leg elevation/compression. Placed on po doxycycline recently. Discussed importance of leg elevation/compression. Pt states he is wearing his compr. stockings daily. Pt states he has trouble keeping his leg elevated because of pain in the wound. Presently using a diuretic.  Presently using collagenase and recent culture revealed Serratia m. No susceptibility results on doxycycline. The wound is improving and may be ready soon for connor again.  3/19/25 wound has improved. Increased granulation and decreased fibrotic tissue. No SOI. will consider re biopsy if wound not improving 3/27/25 left lateral lower leg wound  and smaller. No SOI Still unable to return to work. no available place to elevate lower legs 4/10/25 1 week S/P debridement left lateral lower leg. Path report inflammatory necrotic tissue and epithelium. No SOI Central wound hypertrophic granulation treated with AgNO3  1 stick 4/17/25 left lateral lower leg appears smaller and . 1 stick AgNO3 to hypertrophic granulation. No SOI 4/24/25 left lateral lower leg wound about the same size. Hypertrophic granulation addressed with 1 stick of AgNO3. Drainage brownish and periwound with slight increase in warmth. Does not look infected bur wound cultured and doxycycline started. 5/1/25 left lateral lower leg wound smaller. Less hypertrophic granulation. 1 stick of AgNO3 used. Less drainage. No SOI 5/8/25 left lateral lower leg wound smaller. Hypertrophic granulation cauterized with 1 stick AgNO3. No SOI 5/15/25 left lower leg wound smaller with what appears to be a small skin bridge with a scab on distal end. Hypertrophic granulation cauterized with 1 stick of AgNO3. No SOI 5/22/25 left lower leg  wound smaller with increased area of epithelialization small amount of hypertrophic granulation cauterized with 1 stick on AgNO3. No SOI 5/29/25 left lateral lower leg wound much smaller and . Less hypertrophic granulation noted. ! stick of AgNO3 to granulation 6/5/25 left lower leg wound smaller. Some hypertrophic granulation. 1 stick AgNO3 used. no SOI 6/12/25 left lower leg wound smaller with increased epithelium as well.. Hypertrophic granulation cauterized with AgNO3 1 stick. No SOI 6/19/25 left lower lateral leg wound smaller. Minimal hyperthropic granulation cauterized with 1 stick of AgNO3. No SOI 6/25/25 left lower lateral  leg wound almost closed with some scabbed over spots. No SOI 7/3/25 left lower leg almost completely closed. No SOI

## 2025-07-09 NOTE — ASSESSMENT
[Verbal] : Verbal [Demo] : Demo [Patient] : Patient [Good - alert, interested, motivated] : Good - alert, interested, motivated [Verbalizes knowledge/Understanding] : Verbalizes knowledge/understanding [Dressing changes] : dressing changes [Skin Care] : skin care [Signs and symptoms of infection] : sign and symptoms of infection [How and When to Call] : how and when to call [Pain Management] : pain management [Compression Therapy] : compression therapy [Patient responsibility to plan of care] : patient responsibility to plan of care [Stable] : stable [Home] : Home [Ambulatory] : Ambulatory [Not Applicable - Long Term Care/Home Health Agency] : Long Term Care/Home Health Agency: Not Applicable [] : No [FreeTextEntry2] : Infection prevention Localized wound care  Goal remaining pain free regarding wounds  [FreeTextEntry4] : - MD assessed and advised to continue to treat as ordered. - Pt stated last visit that he would allow MD to attempt to remove scab next week. This week he said "no!" - Pt performs his own dressing changes. Has ample supplies. F/U up 2 weeks

## 2025-07-09 NOTE — HISTORY OF PRESENT ILLNESS
[FreeTextEntry1] : 73 yo WM, here for f/u of chronic LLE traumatic wound. Has been using collagenase and more granulation tissue now seen. No SOI. Recent bx was neg by dermatologist as per pt. Pt met with vascular surg. Dr. Kelley who recommended leg elevation/compression. Placed on po doxycycline recently. Discussed importance of leg elevation/compression. Pt states he is wearing his compr. stockings daily. Pt states he has trouble keeping his leg elevated because of pain in the wound. Presently using a diuretic.  Presently using collagenase and recent culture revealed Serratia m. No susceptibility results on doxycycline. The wound is improving and may be ready soon for connor again.  3/19/25 wound has improved. Increased granulation and decreased fibrotic tissue. No SOI. will consider re biopsy if wound not improving 3/27/25 left lateral lower leg wound  and smaller. No SOI Still unable to return to work. no available place to elevate lower legs 4/10/25 1 week S/P debridement left lateral lower leg. Path report inflammatory necrotic tissue and epithelium. No SOI Central wound hypertrophic granulation treated with AgNO3  1 stick 4/17/25 left lateral lower leg appears smaller and . 1 stick AgNO3 to hypertrophic granulation. No SOI 4/24/25 left lateral lower leg wound about the same size. Hypertrophic granulation addressed with 1 stick of AgNO3. Drainage brownish and periwound with slight increase in warmth. Does not look infected bur wound cultured and doxycycline started. 5/1/25 left lateral lower leg wound smaller. Less hypertrophic granulation. 1 stick of AgNO3 used. Less drainage. No SOI 5/8/25 left lateral lower leg wound smaller. Hypertrophic granulation cauterized with 1 stick AgNO3. No SOI 5/15/25 left lower leg wound smaller with what appears to be a small skin bridge with a scab on distal end. Hypertrophic granulation cauterized with 1 stick of AgNO3. No SOI 5/22/25 left lower leg  wound smaller with increased area of epithelialization small amount of hypertrophic granulation cauterized with 1 stick on AgNO3. No SOI 5/29/25 left lateral lower leg wound much smaller and . Less hypertrophic granulation noted. ! stick of AgNO3 to granulation 6/5/25 left lower leg wound smaller. Some hypertrophic granulation. 1 stick AgNO3 used. no SOI 6/12/25 left lower leg wound smaller with increased epithelium as well.. Hypertrophic granulation cauterized with AgNO3 1 stick. No SOI 6/19/25 left lower lateral leg wound smaller. Minimal hyperthropic granulation cauterized with 1 stick of AgNO3. No SOI 6/25/25 left lower lateral  leg wound almost closed with some scabbed over spots. No SOI 7/3/25 left lower leg almost completely closed. No SOI 7/9/25 left lateral lower leg wound appears closed but still some adherent scab that patient refuses to allow removal.. No SOI

## 2025-07-09 NOTE — PHYSICAL EXAM
[4 x 4] : 4 x 4  [JVD] : no jugular venous distention  [Normal Thyroid] : the thyroid was normal [Normal Breath Sounds] : Normal breath sounds [Normal Heart Sounds] : normal heart sounds [Normal Rate and Rhythm] : normal rate and rhythm [Abdomen Masses] : No abdominal massess [Abdomen Tenderness] : ~T ~M No abdominal tenderness [Tender] : nontender [Enlarged] : not enlarged [Alert] : alert [Oriented to Person] : oriented to person [Oriented to Place] : oriented to place [Oriented to Time] : oriented to time [Calm] : calm [de-identified] : elderly WM, NAD, alert, Ox3. [FreeTextEntry1] : Left Lateral Lower Leg - 2 scabs  by large bridge of epithelial tissue  [FreeTextEntry2] : 5.6 [FreeTextEntry3] : 1.7 [FreeTextEntry4] : scab  [de-identified] : Own compression stockings  [de-identified] : Adaptic Touch  [de-identified] : Mechanically cleansed with sterile gauze and normal saline. Cloth tape  [employer]erlix   [TWNoteComboBox4] : None [TWNoteComboBox5] : No [TWNoteComboBox6] : Traumatic [de-identified] : No [de-identified] : Normal [de-identified] : None [de-identified] : None [de-identified] : None [de-identified] : No [de-identified] : Other [de-identified] : Daily [de-identified] : Primary Dressing

## 2025-07-09 NOTE — REVIEW OF SYSTEMS
[Skin Wound] : skin wound [Negative] : Psychiatric [FreeTextEntry5] : hypertension, hyperlipidemia, mitral valve repair [FreeTextEntry8] : BPH [de-identified] : wound left lateral lower leg

## 2025-07-23 NOTE — HISTORY OF PRESENT ILLNESS
[FreeTextEntry1] : 74 yo WM, here for f/u of laceration to his left lateral LE. Only an adherent eschar reemains which pt does not want  removed. Did develop a new laceration posteriorly with some drainage, but covered by an eschar right now. No SOI.

## 2025-07-23 NOTE — ASSESSMENT
[Verbal] : Verbal [Demo] : Demo [Patient] : Patient [Good - alert, interested, motivated] : Good - alert, interested, motivated [Verbalizes knowledge/Understanding] : Verbalizes knowledge/understanding [Dressing changes] : dressing changes [Skin Care] : skin care [Signs and symptoms of infection] : sign and symptoms of infection [Nutrition] : nutrition [How and When to Call] : how and when to call [Pain Management] : pain management [Compression Therapy] : compression therapy [Patient responsibility to plan of care] : patient responsibility to plan of care [] : Yes [FreeTextEntry2] : Infection prevention Localized wound care  Goal remaining pain free regarding wounds  [FreeTextEntry4] : Pt performs his own dressing changes. Has ample supplies. Patient verbalized understanding of all discussed. Patient refused copy of wound care instructions. F/U up 2 weeks  [Stable] : stable [Home] : Home [Ambulatory] : Ambulatory [Not Applicable - Long Term Care/Home Health Agency] : Long Term Care/Home Health Agency: Not Applicable

## 2025-07-23 NOTE — PLAN
[FreeTextEntry1] : adaptic, DD qod to left lateral LE adaptic, alginate, DD qod to left post. LE f/u 2 wks  time spent 25 mins.

## 2025-07-23 NOTE — PHYSICAL EXAM
[Normal Thyroid] : the thyroid was normal [Normal Breath Sounds] : Normal breath sounds [Normal Heart Sounds] : normal heart sounds [Normal Rate and Rhythm] : normal rate and rhythm [Alert] : alert [Oriented to Person] : oriented to person [Oriented to Place] : oriented to place [Oriented to Time] : oriented to time [Calm] : calm [JVD] : no jugular venous distention  [Abdomen Masses] : No abdominal massess [Abdomen Tenderness] : ~T ~M No abdominal tenderness [Tender] : nontender [Enlarged] : not enlarged [de-identified] : elderly WM, NAD, alert, Ox3 [4 x 4] : 4 x 4  [FreeTextEntry1] : Left Lateral Lower Leg - 2 scabs  by large bridge of epithelial tissue  [FreeTextEntry2] : 4.5 [FreeTextEntry3] : 1.0 [FreeTextEntry4] : scab  [de-identified] : Own compression stockings  [de-identified] : Adaptic Touch  [de-identified] : Mechanically cleansed with sterile gauze and normal saline. Cloth tape  [employer]erlix   [FreeTextEntry7] : Left Posterior leg  [FreeTextEntry8] : 2.6 [FreeTextEntry9] : 1.0 [de-identified] : 0.1 [de-identified] :  serosanguinous  [de-identified] : musab [de-identified] : Adaptic Touch, Calcium Alginate   [de-identified] : Mechanically cleansed with sterile gauze and normal saline. Cloth tape  [employer]erl[employer]   [TWNoteComboBox4] : None [TWNoteComboBox5] : No [TWNoteComboBox6] : Traumatic [de-identified] : No [de-identified] : Normal [de-identified] : None [de-identified] : None [de-identified] : None [de-identified] : No [de-identified] : Other [de-identified] : 3x Weekly [de-identified] : Primary Dressing [de-identified] : Moderate [de-identified] : No [de-identified] : No [de-identified] : Normal [de-identified] : None [de-identified] : None [de-identified] : No [de-identified] : 3x Weekly [de-identified] : Primary Dressing

## 2025-07-23 NOTE — REVIEW OF SYSTEMS
[Skin Wound] : skin wound [Negative] : Psychiatric [FreeTextEntry5] : hypertension, hyperlipidemia, mitral valve repair [FreeTextEntry8] : BPH [de-identified] : wound left lateral lower leg

## 2025-07-23 NOTE — VITALS
[Pain related to present condition?] : The patient's  pain is not related to present condition. [] : No [de-identified] : 0/10